# Patient Record
Sex: MALE | Race: WHITE | Employment: FULL TIME | URBAN - METROPOLITAN AREA
[De-identification: names, ages, dates, MRNs, and addresses within clinical notes are randomized per-mention and may not be internally consistent; named-entity substitution may affect disease eponyms.]

---

## 2017-05-09 DIAGNOSIS — R53.83 OTHER FATIGUE: ICD-10-CM

## 2017-05-09 DIAGNOSIS — Z12.5 ENCOUNTER FOR SCREENING FOR MALIGNANT NEOPLASM OF PROSTATE: ICD-10-CM

## 2017-05-09 DIAGNOSIS — Z00.00 ENCOUNTER FOR GENERAL ADULT MEDICAL EXAMINATION WITHOUT ABNORMAL FINDINGS: ICD-10-CM

## 2017-05-09 DIAGNOSIS — F32.9 MAJOR DEPRESSIVE DISORDER, SINGLE EPISODE: ICD-10-CM

## 2017-07-21 ENCOUNTER — LAB CONVERSION - ENCOUNTER (OUTPATIENT)
Dept: OTHER | Facility: OTHER | Age: 52
End: 2017-07-21

## 2017-07-21 ENCOUNTER — GENERIC CONVERSION - ENCOUNTER (OUTPATIENT)
Dept: OTHER | Facility: OTHER | Age: 52
End: 2017-07-21

## 2017-07-21 LAB
A/G RATIO (HISTORICAL): 1.6 (CALC) (ref 1–2.5)
ALBUMIN SERPL BCP-MCNC: 4.2 G/DL (ref 3.6–5.1)
ALP SERPL-CCNC: 53 U/L (ref 33–130)
ALT SERPL W P-5'-P-CCNC: 16 U/L (ref 6–29)
AST SERPL W P-5'-P-CCNC: 17 U/L (ref 10–35)
BASOPHILS # BLD AUTO: 1.1 %
BASOPHILS # BLD AUTO: 41 CELLS/UL (ref 0–200)
BILIRUB SERPL-MCNC: 0.6 MG/DL (ref 0.2–1.2)
BUN SERPL-MCNC: 15 MG/DL (ref 7–25)
BUN/CREA RATIO (HISTORICAL): 12 (CALC) (ref 6–22)
CALCIUM SERPL-MCNC: 9.2 MG/DL (ref 8.6–10.4)
CHLORIDE SERPL-SCNC: 107 MMOL/L (ref 98–110)
CHOLEST SERPL-MCNC: 160 MG/DL (ref 125–200)
CHOLEST/HDLC SERPL: 3.3 (CALC)
CO2 SERPL-SCNC: 29 MMOL/L (ref 20–31)
CREAT SERPL-MCNC: 1.21 MG/DL (ref 0.5–1.05)
DEPRECATED RDW RBC AUTO: 12.1 % (ref 11–15)
EGFR AFRICAN AMERICAN (HISTORICAL): 60 ML/MIN/1.73M2
EGFR-AMERICAN CALC (HISTORICAL): 52 ML/MIN/1.73M2
EOSINOPHIL # BLD AUTO: 130 CELLS/UL (ref 15–500)
EOSINOPHIL # BLD AUTO: 3.5 %
GAMMA GLOBULIN (HISTORICAL): 2.6 G/DL (CALC) (ref 1.9–3.7)
GLUCOSE (HISTORICAL): 93 MG/DL (ref 65–99)
HCT VFR BLD AUTO: 45.5 % (ref 35–45)
HDLC SERPL-MCNC: 48 MG/DL
HGB BLD-MCNC: 15.2 G/DL (ref 11.7–15.5)
LDL CHOLESTEROL (HISTORICAL): 92 MG/DL (CALC)
LYMPHOCYTES # BLD AUTO: 1695 CELLS/UL (ref 850–3900)
LYMPHOCYTES # BLD AUTO: 45.8 %
MCH RBC QN AUTO: 29.9 PG (ref 27–33)
MCHC RBC AUTO-ENTMCNC: 33.4 G/DL (ref 32–36)
MCV RBC AUTO: 89.4 FL (ref 80–100)
MONOCYTES # BLD AUTO: 429 CELLS/UL (ref 200–950)
MONOCYTES (HISTORICAL): 11.6 %
NEUTROPHILS # BLD AUTO: 1406 CELLS/UL (ref 1500–7800)
NEUTROPHILS # BLD AUTO: 38 %
NON-HDL-CHOL (CHOL-HDL) (HISTORICAL): 112 MG/DL (CALC)
PLATELET # BLD AUTO: 204 THOUSAND/UL (ref 140–400)
PMV BLD AUTO: 9.6 FL (ref 7.5–12.5)
POTASSIUM SERPL-SCNC: 4.1 MMOL/L (ref 3.5–5.3)
PROSTATE SPECIFIC ANTIGEN TOTAL (HISTORICAL): 1.8 NG/ML
RBC # BLD AUTO: 5.09 MILLION/UL (ref 3.8–5.1)
SODIUM SERPL-SCNC: 143 MMOL/L (ref 135–146)
TOTAL PROTEIN (HISTORICAL): 6.8 G/DL (ref 6.1–8.1)
TRIGL SERPL-MCNC: 98 MG/DL
TSH SERPL DL<=0.05 MIU/L-ACNC: 1.46 MIU/L
WBC # BLD AUTO: 3.7 THOUSAND/UL (ref 3.8–10.8)

## 2017-08-03 ENCOUNTER — GENERIC CONVERSION - ENCOUNTER (OUTPATIENT)
Dept: OTHER | Facility: OTHER | Age: 52
End: 2017-08-03

## 2017-08-03 ENCOUNTER — ALLSCRIPTS OFFICE VISIT (OUTPATIENT)
Dept: OTHER | Facility: OTHER | Age: 52
End: 2017-08-03

## 2018-01-08 ENCOUNTER — GENERIC CONVERSION - ENCOUNTER (OUTPATIENT)
Dept: FAMILY MEDICINE CLINIC | Facility: CLINIC | Age: 53
End: 2018-01-08

## 2018-01-13 VITALS
HEIGHT: 72 IN | BODY MASS INDEX: 24.97 KG/M2 | SYSTOLIC BLOOD PRESSURE: 130 MMHG | DIASTOLIC BLOOD PRESSURE: 70 MMHG | HEART RATE: 60 BPM | WEIGHT: 184.38 LBS

## 2018-01-15 NOTE — RESULT NOTES
Verified Results  (Q) LIPID PANEL WITH REFLEX TO DIRECT LDL [id number] 07:15AM [facility]     Test Name Result Flag Reference   CHOLESTEROL, TOTAL 160 mg/dL  125-200   HDL CHOLESTEROL 48 mg/dL  > OR = 46   TRIGLICERIDES 98 mg/dL  <113   LDL-CHOLESTEROL 92 mg/dL (calc)  <130   Desirable range <100 mg/dL for patients with CHD or  diabetes and <70 mg/dL for diabetic patients with  known heart disease  CHOL/HDLC RATIO 3 3 (calc)  < OR = 5 0   NON HDL CHOLESTEROL 112 mg/dL (calc)     Target for non-HDL cholesterol is 30 mg/dL higher than   LDL cholesterol target  (1) COMPREHENSIVE METABOLIC PANEL [id number] 09:18RH [facility]     Test Name Result Flag Reference   GLUCOSE 93 mg/dL  65-99   Fasting reference interval   UREA NITROGEN (BUN) 15 mg/dL  7-25   CREATININE 1 21 mg/dL H 0 50-1 05   For patients >52years of age, the reference limit  for Creatinine is approximately 13% higher for people  identified as -American  eGFR NON-AFR   AMERICAN 52 mL/min/1 73m2 L > OR = 60   eGFR AFRICAN AMERICAN 60 mL/min/1 73m2  > OR = 60   BUN/CREATININE RATIO 12 (calc)  6-22   SODIUM 143 mmol/L  135-146   POTASSIUM 4 1 mmol/L  3 5-5 3   CHLORIDE 107 mmol/L     CARBON DIOXIDE 29 mmol/L  20-31   CALCIUM 9 2 mg/dL  8 6-10 4   PROTEIN, TOTAL 6 8 g/dL  6 1-8 1   ALBUMIN 4 2 g/dL  3 6-5 1   GLOBULIN 2 6 g/dL (calc)  1 9-3 7   ALBUMIN/GLOBULIN RATIO 1 6 (calc)  1 0-2 5   BILIRUBIN, TOTAL 0 6 mg/dL  0 2-1 2   ALKALINE PHOSPHATASE 53 U/L     AST 17 U/L  10-35   ALT 16 U/L  6-29     (1) CBC/PLT/DIFF [id number] 07:15AM [facility]     Test Name Result Flag Reference   WHITE BLOOD CELL COUNT 3 7 Thousand/uL L 3 8-10 8   RED BLOOD CELL COUNT 5 09 Million/uL  3 80-5 10   HEMOGLOBIN 15 2 g/dL  11 7-15 5   HEMATOCRIT 45 5 % H 35 0-45 0   MCV 89 4 fL  80 0-100 0   MCH 29 9 pg  27 0-33 0   MCHC 33 4 g/dL  32 0-36 0   RDW 12 1 %  11 0-15 0   PLATELET COUNT 322 Thousand/uL  140-400   ABSOLUTE NEUTROPHILS 1406 cells/uL L 9932-7330   ABSOLUTE LYMPHOCYTES 1695 cells/uL  850-3900   ABSOLUTE MONOCYTES 429 cells/uL  200-950   ABSOLUTE EOSINOPHILS 130 cells/uL     ABSOLUTE BASOPHILS 41 cells/uL  0-200   NEUTROPHILS 38 %     LYMPHOCYTES 45 8 %     MONOCYTES 11 6 %     EOSINOPHILS 3 5 %     BASOPHILS 1 1 %     MPV 9 6 fL  7 5-12 5     (1) PSA (SCREEN) (Dx V76 44 Screen for Prostate Cancer) [id number] 07:15AM [facility]     Test Name Result Flag Reference   PSA, TOTAL 1 8 ng/mL     The total PSA value from this assay system is   standardized against the WHO standard  The test   result will be approximately 20% lower when compared   to the equimolar-standardized total PSA (Naldo   [place])  Comparison of serial PSA results should be   interpreted with this fact in mind  This test was performed using the Siemens   chemiluminescent method  Values obtained from   different assay methods cannot be used  interchangeably  PSA levels, regardless of  value, should not be interpreted as absolute  evidence of the presence or absence of disease                 Reference Range               Female:  Not established                Male:    < or = 4 0     (Q) TSH, 3RD GENERATION W/REFLEX TO FT4 [id number] 07:15AM [facility]     Test Name Result Flag Reference   TSH W/REFLEX TO FT4 1 46 mIU/L     Reference Range                         > or = 20 Years  0 40-4 50                              Pregnancy Ranges            First trimester    0 26-2 66            Second trimester   0 55-2 73            Third trimester    0 43-2 91

## 2018-01-15 NOTE — MISCELLANEOUS
Message  received call from lab with "critical results" absolute neutrophils - per [facility], results ok to wait until pt's appt next wk      Active Problems    1  Abdominal pain (789 00) (R10 9)   2  Acute pharyngitis (462) (J02 9)   3  Depression (311) (F32 9)   4  Encounter for prostate cancer screening (V76 44) (Z12 5)   5  Epidermal inclusion cyst (706 2) (L72 0)   6  Erectile dysfunction of non-organic origin (302 72) (F52 21)   7  Exposure to gonorrhea (V01 6) (Z20 2)   8  Fatigue (780 79) (R53 83)   9  High risk sexual behavior (V69 2) (Z72 51)   10  Leukopenia (288 50) (D72 819)   11  Medial epicondylitis (726 31) (M77 00)   12  Palpitations (785 1) (R00 2)   13  Screening for heart disease (V81 2) (Z13 6)   14  Skin lesion of face (709 9) (L98 9)   15  Verruca vulgaris (078 10) (B07 9)    Current Meds   1  Cialis 5 MG Oral Tablet; take 1 tablet daily 1 hour before needed; Therapy: 69Exe9367 to (Evaluate:10Nov2015)  Requested for: 49Mpe0867; Last   Rx:[id number] Ordered   2  Citalopram Hydrobromide 10 MG Oral Tablet; Take 1 tablet by mouth daily; Therapy: 11MCV8608 to (Evaluate:05Jun2016)  Requested for: 40JJP6951; Last   Rx:[id number] Ordered   3  Diclofenac Potassium 50 MG Oral Tablet; take 1 tablet 3 times daily after meals; Therapy: 28SXF2539 to (Evaluate:30Mar2016)  Requested for: 79Wyx1481; Last   Rx:[id number] Ordered    Allergies    1   No Known Drug Allergies    Signatures   Electronically signed by : Rahel Abreu, ; May 20 2016 12:41PM EST                       (Author)

## 2018-12-24 ENCOUNTER — OFFICE VISIT (OUTPATIENT)
Dept: URGENT CARE | Facility: MEDICAL CENTER | Age: 53
End: 2018-12-24
Payer: COMMERCIAL

## 2018-12-24 ENCOUNTER — APPOINTMENT (OUTPATIENT)
Dept: RADIOLOGY | Facility: MEDICAL CENTER | Age: 53
End: 2018-12-24
Payer: COMMERCIAL

## 2018-12-24 VITALS
OXYGEN SATURATION: 99 % | WEIGHT: 179.4 LBS | HEART RATE: 61 BPM | BODY MASS INDEX: 24.3 KG/M2 | TEMPERATURE: 97 F | HEIGHT: 72 IN | SYSTOLIC BLOOD PRESSURE: 116 MMHG | DIASTOLIC BLOOD PRESSURE: 80 MMHG | RESPIRATION RATE: 18 BRPM

## 2018-12-24 DIAGNOSIS — J02.9 ACUTE PHARYNGITIS, UNSPECIFIED ETIOLOGY: ICD-10-CM

## 2018-12-24 DIAGNOSIS — R07.9 CHEST PAIN, UNSPECIFIED TYPE: Primary | ICD-10-CM

## 2018-12-24 DIAGNOSIS — R07.9 CHEST PAIN, UNSPECIFIED TYPE: ICD-10-CM

## 2018-12-24 LAB — S PYO AG THROAT QL: NEGATIVE

## 2018-12-24 PROCEDURE — 87430 STREP A AG IA: CPT | Performed by: NURSE PRACTITIONER

## 2018-12-24 PROCEDURE — 87070 CULTURE OTHR SPECIMN AEROBIC: CPT | Performed by: NURSE PRACTITIONER

## 2018-12-24 PROCEDURE — 71046 X-RAY EXAM CHEST 2 VIEWS: CPT

## 2018-12-24 PROCEDURE — 93005 ELECTROCARDIOGRAM TRACING: CPT | Performed by: NURSE PRACTITIONER

## 2018-12-24 PROCEDURE — 99204 OFFICE O/P NEW MOD 45 MIN: CPT | Performed by: NURSE PRACTITIONER

## 2018-12-24 NOTE — PROGRESS NOTES
[facility]        NAME: Fidelia Mcknight is a 48 y o  male  : 1965    MRN: [id number]  DATE: 2018  TIME: 11:36 AM    Assessment and Plan   Chest pain, unspecified type [R07 9]  1  Chest pain, unspecified type  XR chest pa & lateral    POCT ECG   2  Acute pharyngitis, unspecified etiology  POCT rapid strepA    Throat culture         Patient Instructions   In office rapid strep negative, await throat culture  In office EKG interpreted as sinus himanshu  In office CXR interpreted as negative,confirmed by radiology  Suspected to be related to muscle strain  No acute findings at this time  VSS  May alternate Tylenol and Ibuprofen as needed  Encourage fluids and rest    Saline nasal spray as needed  Humidify bedroom  Salt water gargles  Chloraseptic spray and lozenges as needed  Await throat culture  Follow up with PCP in 3-5 days  Proceed to  ER if symptoms worsen  Chief Complaint     Chief Complaint   Patient presents with    Pain With Breathing     Patient here with complaint of chest discomfort with deep breaths  Slight cough  History of Present Illness       Patient presents in office with pain in L side of ACW with deep inhalation after [employer]  Feels like pain wraps around to back  Last evening felt "a gurgling sensation" in chest  Also with dry cough, noting to be clearing cough  Denies fevers, chills, N/V  One episode of diarrhea, no blood in stool  No previous cardiac history  Worse with deep breaths  Symptoms have improved since last evening  Denies numbness/tingling/heaviness of L arm  Was splitting firewood yesterday after pain  No specific injury/trauma/fall  Review of Systems   Review of Systems   Constitutional: Negative for chills and fever  HENT: Positive for postnasal drip and rhinorrhea  Negative for congestion, ear pain, sinus pressure and sore throat      Respiratory: Negative for cough, chest tightness, shortness of breath and wheezing  Cardiovascular: Positive for chest pain  Negative for palpitations and leg swelling  Gastrointestinal: Negative  Musculoskeletal: Negative for myalgias  Skin: Negative for rash  Current Medications     No current outpatient prescriptions on file  Current Allergies     Allergies as of 12/24/2018 - Reviewed 12/24/2018   Allergen Reaction Noted    Glyceryl monolaurate Other (See Comments) 09/06/2008    Latex Other (See Comments) 09/06/2008            The following portions of the patient's history were reviewed and updated as appropriate: allergies, current medications, past family history, past medical history, past social history, past surgical history and problem list      No past medical history on file  No past surgical history on file  No family history on file  Medications have been verified  Objective   /80   Pulse 61   Temp (!) 97 °F (36 1 °C)   Resp 18   Ht 6' (1 829 m)   Wt 81 4 kg (179 lb 6 4 oz)   SpO2 99%   BMI 24 33 kg/m²        Physical Exam     Physical Exam   Constitutional: He is oriented to person, place, and time  Vital signs are normal  He appears well-developed and well-nourished  He is cooperative  Non-toxic appearance  He does not have a sickly appearance  He does not appear ill  No distress  HENT:   Head: Normocephalic and atraumatic  Right Ear: Hearing, tympanic membrane, external ear and ear canal normal    Left Ear: Hearing, tympanic membrane, external ear and ear canal normal    Nose: Nose normal  No mucosal edema, rhinorrhea or sinus tenderness  Right sinus exhibits no maxillary sinus tenderness and no frontal sinus tenderness  Left sinus exhibits no maxillary sinus tenderness and no frontal sinus tenderness  Mouth/Throat: Uvula is midline and mucous membranes are normal  Normal dentition  Posterior oropharyngeal erythema present  No oropharyngeal exudate  Eyes: Pupils are equal, round, and reactive to light  Conjunctivae, EOM and lids are normal  Right eye exhibits no discharge  Left eye exhibits no discharge  Neck: Trachea normal and normal range of motion  No thyroid mass and no thyromegaly present  Cardiovascular: Normal rate, regular rhythm, S1 normal, S2 normal, normal heart sounds, intact distal pulses and normal pulses  Exam reveals no gallop and no friction rub  No murmur heard  Pulmonary/Chest: Effort normal and breath sounds normal  No respiratory distress  He has no decreased breath sounds  He has no wheezes  He has no rhonchi  He has no rales  He exhibits no tenderness  No chest wall tenderness noted  Full ROM noted of L shoulder  Musculoskeletal: Normal range of motion  He exhibits no edema  Cervical back: Normal         Thoracic back: Normal    Lymphadenopathy:     He has no cervical adenopathy  Neurological: He is alert and oriented to person, place, and time  Skin: Skin is warm and dry  No rash noted  He is not diaphoretic  Psychiatric: He has a normal mood and affect  His behavior is normal  Thought content normal    Nursing note and vitals reviewed

## 2018-12-24 NOTE — PATIENT INSTRUCTIONS
May alternate Tylenol and Ibuprofen as needed  Encourage fluids and rest    Saline nasal spray as needed  Humidify bedroom  Salt water gargles  Chloraseptic spray and lozenges as needed  Await throat culture  F/U with PCP if symptoms persist/worsen or go to nearest emergency department if any signs of distress  Muscle Strain   AMBULATORY CARE:   A muscle strain  is a twist, pull, or tear of a muscle or tendon  A tendon is a strong elastic tissue that connects a muscle to a bone  Common symptoms include the following:   · Bruised skin on the area of your injured muscle    · Muscle soreness, cramps, or spasms    · Little or stiff muscle movement, or loss of muscle strength    · Swelling in the area of the injury    · Muscle pain that gets worse with activity, or pain that moves or spreads to another body area    · Crepitus (crackling sound or grating feeling) when you move your muscle  Seek immediate care for the following symptoms:   · Sudden loss of feeling or movement in your injured muscle    Contact your healthcare provider if:   · Your pain and swelling worsen or do not go away  · You have questions or concerns about your condition or care  Treatment for a muscle strain  may include any of the following:  · NSAIDs , such as ibuprofen, help decrease swelling, pain, and fever  This medicine is available with or without a doctor's order  NSAIDs can cause stomach bleeding or kidney problems in certain people  If you take blood thinner medicine, always ask your healthcare provider if NSAIDs are safe for you  Always read the medicine label and follow directions  · Muscle relaxers  help decrease pain and muscle spasms, and relax your muscles  · Steroid medicine  may be given to decrease pain and inflammation  · Local anesthetic  is medicine used to numb the area for a short time  This is often used if you have a muscle strain in your back      · Physical therapy exercises  may help improve movement and decrease pain  Physical therapy can also help improve strength and decrease your risk for loss of function  · Surgery  may be needed if your muscle strain does not heal after 6 months of treatment  Surgery may be done to drain blood that has pooled in your muscle  If your tendon was torn off of the bone, it may be put back with surgery  Manage your symptoms:   · Rest your muscle  to allow your injury to heal  When the pain decreases, begin normal, slow movements  For mild and moderate muscle strains, rest your muscles for about 2 days  Rest for 10 to 14 days if you have a severe muscle strain  You may need to use crutches if your muscle strain is in your legs  · Apply ice on your injured area  for 15 to 20 minutes every hour or as directed  Use an ice pack, or put crushed ice in a plastic bag  Cover it with a towel  Ice helps prevent tissue damage and decreases swelling and pain  · Use an elastic bandage as directed  Wrap the bandage around the area to decrease swelling  It should be snug, but not too tight  · Elevate your injured muscle  above the level of your heart as often as you can  This will help decrease swelling and pain  Prop your injured muscle on pillows or blankets to keep it elevated comfortably  · Stretch and strengthen your muscles each day  Stretch for about 30 seconds, 4 times a day  Stretch the muscle until you feel a slight pull  Stop stretching if you feel pain  Start to exercise and strengthen your muscles slowly  Increase the time and amount you exercise  Prevent another muscle strain:   · Always wear proper shoes when you play sports  Replace your old running shoes with new ones often if you are a runner  Use shoe inserts or arch supports to correct leg or foot problems  Ask your healthcare provider for more information on shoe supports  · Do warm up and cool down exercises  Do stretching exercises before you work out or do sports activities  These exercises will help loosen and decrease stress on your muscles  Cool down and stretch after your workout  Do not stop and rest after a workout without cooling down first            · Keep your muscles strong with strength training exercises  Exercises such as weight lifting and stretching exercises help keep your muscles flexible and strong  A physical therapist or  may help you with these exercises  · Slowly start your exercise or sports training program   Follow your healthcare provider's directions on when to start exercising  Slowly increase time, distance, and how often you train  Sudden increases in how often you train may cause you to injure your muscle again  Follow up with your healthcare provider as directed:  Write down your questions so you remember to ask them during your visits  © 2017 [employer] Information is for End User's use only and may not be sold, redistributed or otherwise used for commercial purposes  All illustrations and images included in CareNotes® are the copyrighted property of [employer]  or [employer]  The above information is an  only  It is not intended as medical advice for individual conditions or treatments  Talk to your doctor, nurse or pharmacist before following any medical regimen to see if it is safe and effective for you

## 2018-12-25 LAB
ATRIAL RATE: 59 BPM
P AXIS: 60 DEGREES
PR INTERVAL: 162 MS
QRS AXIS: 75 DEGREES
QRSD INTERVAL: 106 MS
QT INTERVAL: 420 MS
QTC INTERVAL: 415 MS
T WAVE AXIS: 49 DEGREES
VENTRICULAR RATE: 59 BPM

## 2018-12-25 PROCEDURE — 93010 ELECTROCARDIOGRAM REPORT: CPT | Performed by: INTERNAL MEDICINE

## 2018-12-26 LAB — BACTERIA THROAT CULT: NORMAL

## 2018-12-30 PROCEDURE — 93010 ELECTROCARDIOGRAM REPORT: CPT | Performed by: INTERNAL MEDICINE

## 2019-01-15 ENCOUNTER — OFFICE VISIT (OUTPATIENT)
Dept: FAMILY MEDICINE CLINIC | Facility: CLINIC | Age: 54
End: 2019-01-15
Payer: COMMERCIAL

## 2019-01-15 VITALS
HEART RATE: 80 BPM | DIASTOLIC BLOOD PRESSURE: 84 MMHG | BODY MASS INDEX: 24.24 KG/M2 | WEIGHT: 179 LBS | HEIGHT: 72 IN | SYSTOLIC BLOOD PRESSURE: 122 MMHG

## 2019-01-15 DIAGNOSIS — N40.0 BENIGN PROSTATIC HYPERPLASIA, UNSPECIFIED WHETHER LOWER URINARY TRACT SYMPTOMS PRESENT: ICD-10-CM

## 2019-01-15 DIAGNOSIS — Z13.6 SCREENING FOR HEART DISEASE: ICD-10-CM
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
## 2019-01-15 DIAGNOSIS — Z00.00 HEALTH CARE MAINTENANCE: Primary | ICD-10-CM

## 2019-01-15 DIAGNOSIS — F52.21 ERECTILE DISORDER, ACQUIRED, SITUATIONAL, MILD: ICD-10-CM

## 2019-01-15 PROBLEM — K90.0 CELIAC DISEASE: Status: ACTIVE | Noted: 2019-01-15

## 2019-01-15 PROCEDURE — 99396 PREV VISIT EST AGE 40-64: CPT | Performed by: PHYSICIAN ASSISTANT

## 2019-01-15 RX ORDER — SILDENAFIL 25 MG/1
25 TABLET, FILM COATED ORAL DAILY PRN
Qty: 10 TABLET | Refills: 0 | Status: SHIPPED | OUTPATIENT
Start: 2019-01-15 | End: 2021-01-04 | Stop reason: ALTCHOICE

## 2019-01-15 NOTE — PATIENT INSTRUCTIONS
1  Health care maintenance-recommend assessing baseline blood test to include CBC, CMP, lipids, TSH, PSA, and Lyme titer  2  Screening for heart disease-will check lipids in follow-up as necessary  3  History of possible tick bite-will assess Lyme titer  Patient is asymptomatic  4  BPH-presently stable  Patient continues Rapaflo as necessary for symptoms  5  Erectile dysfunction-patient had light sensitivity with higher doses of sildenafil however secondary to the cost we will try lower dose sildenafil rather than switching to Cialis immediately  If he still has symptoms with 25 mg of sildenafil we will then call in a prescription for Cialis instead

## 2019-01-15 NOTE — PROGRESS NOTES
Assessment/Plan:  Patient Instructions   1  Health care maintenance-recommend assessing baseline blood test to include CBC, CMP, lipids, TSH, PSA, and Lyme titer  2  Screening for heart disease-will check lipids in follow-up as necessary  3  History of possible tick bite-will assess Lyme titer  Patient is asymptomatic  4  BPH-presently stable  Patient continues Rapaflo as necessary for symptoms  5  Erectile dysfunction-patient had light sensitivity with higher doses of sildenafil however secondary to the cost we will try lower dose sildenafil rather than switching to Cialis immediately  If he still has symptoms with 25 mg of sildenafil we will then call in a prescription for Cialis instead  Diagnoses and all orders for this visit:    Health care maintenance  -     CBC and differential  -     Comprehensive metabolic panel  -     Lipid Panel with Direct LDL reflex  -     TSH, 3rd generation with Free T4 reflex  -     PSA, Total Screen    Screening for heart disease  -     CBC and differential  -     Comprehensive metabolic panel  -     Lipid Panel with Direct LDL reflex  -     TSH, 3rd generation with Free T4 reflex  -     PSA, Total Screen    Benign prostatic hyperplasia, unspecified whether lower urinary tract symptoms present  -     CBC and differential  -     Comprehensive metabolic panel  -     Lipid Panel with Direct LDL reflex  -     TSH, 3rd generation with Free T4 reflex  -     PSA, Total Screen    Erectile disorder, acquired, situational, mild  -     sildenafil (VIAGRA) 25 MG tablet; Take 1 tablet (25 mg total) by mouth daily as needed for erectile dysfunction    Tick bite, sequela  -     Lyme Antibody Profile with reflex to WB          Subjective:   Yearly Physical  s     Patient ID: Xavi Evans is a 48 y o  male  HPI:  This is a 66-year-old gentleman that presents to the office for health maintenance physical   He has been feeling well without any acute complaints    He has been able to wean off of citalopram several months ago and has been doing well since off of it  He has recently gotten remarried and has been very happy  He does mention that sometimes he has some erectile difficulties and is requesting renewal of Cialis  He has tried Viagra in the past but had some light sensitivity issues with it  He does have a history of celiac disease and occasionally will have some digestive problems after eating something he knows would exacerbate his symptoms  He also continues to follow with colorectal specialist regularly and has had normal colonoscopies  The following portions of the patient's history were reviewed and updated as appropriate: allergies, current medications, past family history, past medical history, past social history, past surgical history and problem list     Review of Systems   Constitutional: Negative for chills, fatigue and fever  HENT: Negative for congestion, ear pain and sinus pressure  Eyes: Negative for visual disturbance  Respiratory: Negative for cough, chest tightness and shortness of breath  Cardiovascular: Negative for chest pain and palpitations  Gastrointestinal: Negative for diarrhea, nausea and vomiting  Endocrine: Negative for polyuria  Genitourinary: Negative for dysuria and frequency  Musculoskeletal: Negative for arthralgias and myalgias  Skin: Negative for pallor and rash  Neurological: Negative for dizziness, weakness, light-headedness, numbness and headaches  Psychiatric/Behavioral: Negative for agitation, behavioral problems and sleep disturbance  All other systems reviewed and are negative  Objective:      /84   Pulse 80   Ht 6' (1 829 m)   Wt 81 2 kg (179 lb)   BMI 24 28 kg/m²          Physical Exam   Constitutional: He is oriented to person, place, and time  He appears well-developed and well-nourished  No distress  HENT:   Head: Normocephalic and atraumatic     Right Ear: External ear normal  Left Ear: External ear normal    Nose: Nose normal    Mouth/Throat: Oropharynx is clear and moist  No oropharyngeal exudate  Eyes: Pupils are equal, round, and reactive to light  Conjunctivae and EOM are normal    Neck: Normal range of motion  Neck supple  No tracheal deviation present  No thyromegaly present  Cardiovascular: Normal rate, regular rhythm and normal heart sounds  Exam reveals no friction rub  No murmur heard  Pulmonary/Chest: Effort normal and breath sounds normal  No respiratory distress  He has no wheezes  He has no rales  Abdominal: Soft  Bowel sounds are normal  He exhibits no distension  There is no tenderness  There is no rebound and no guarding  Musculoskeletal: Normal range of motion  He exhibits no edema or tenderness  Lymphadenopathy:     He has no cervical adenopathy  Neurological: He is alert and oriented to person, place, and time  No cranial nerve deficit  Coordination normal    Skin: Skin is warm and dry  No rash noted  No erythema  Psychiatric: He has a normal mood and affect  His behavior is normal  Thought content normal    Nursing note and vitals reviewed

## 2019-01-23 ENCOUNTER — APPOINTMENT (OUTPATIENT)
Dept: LAB | Facility: CLINIC | Age: 54
End: 2019-01-23
Payer: COMMERCIAL

## 2019-01-23 LAB
ALBUMIN SERPL BCP-MCNC: 4.1 G/DL (ref 3.5–5)
ALP SERPL-CCNC: 49 U/L (ref 46–116)
ALT SERPL W P-5'-P-CCNC: 23 U/L (ref 12–78)
ANION GAP SERPL CALCULATED.3IONS-SCNC: 5 MMOL/L (ref 4–13)
AST SERPL W P-5'-P-CCNC: 16 U/L (ref 5–45)
BASOPHILS # BLD AUTO: 0.03 THOUSANDS/ΜL (ref 0–0.1)
BASOPHILS NFR BLD AUTO: 1 % (ref 0–1)
BILIRUB SERPL-MCNC: 0.86 MG/DL (ref 0.2–1)
BUN SERPL-MCNC: 18 MG/DL (ref 5–25)
CALCIUM SERPL-MCNC: 8.5 MG/DL (ref 8.3–10.1)
CHLORIDE SERPL-SCNC: 106 MMOL/L (ref 100–108)
CHOLEST SERPL-MCNC: 160 MG/DL (ref 50–200)
CO2 SERPL-SCNC: 28 MMOL/L (ref 21–32)
CREAT SERPL-MCNC: 1.2 MG/DL (ref 0.6–1.3)
EOSINOPHIL # BLD AUTO: 0.18 THOUSAND/ΜL (ref 0–0.61)
EOSINOPHIL NFR BLD AUTO: 5 % (ref 0–6)
ERYTHROCYTE [DISTWIDTH] IN BLOOD BY AUTOMATED COUNT: 12.3 % (ref 11.6–15.1)
GFR SERPL CREATININE-BSD FRML MDRD: 69 ML/MIN/1.73SQ M
GLUCOSE P FAST SERPL-MCNC: 97 MG/DL (ref 65–99)
HCT VFR BLD AUTO: 44.7 % (ref 36.5–49.3)
HDLC SERPL-MCNC: 63 MG/DL (ref 40–60)
HGB BLD-MCNC: 14.9 G/DL (ref 12–17)
IMM GRANULOCYTES # BLD AUTO: 0.01 THOUSAND/UL (ref 0–0.2)
IMM GRANULOCYTES NFR BLD AUTO: 0 % (ref 0–2)
LDLC SERPL CALC-MCNC: 82 MG/DL (ref 0–100)
LYMPHOCYTES # BLD AUTO: 1.47 THOUSANDS/ΜL (ref 0.6–4.47)
LYMPHOCYTES NFR BLD AUTO: 43 % (ref 14–44)
MCH RBC QN AUTO: 30.3 PG (ref 26.8–34.3)
MCHC RBC AUTO-ENTMCNC: 33.3 G/DL (ref 31.4–37.4)
MCV RBC AUTO: 91 FL (ref 82–98)
MONOCYTES # BLD AUTO: 0.35 THOUSAND/ΜL (ref 0.17–1.22)
MONOCYTES NFR BLD AUTO: 10 % (ref 4–12)
NEUTROPHILS # BLD AUTO: 1.41 THOUSANDS/ΜL (ref 1.85–7.62)
NEUTS SEG NFR BLD AUTO: 41 % (ref 43–75)
NRBC BLD AUTO-RTO: 0 /100 WBCS
PLATELET # BLD AUTO: 189 THOUSANDS/UL (ref 149–390)
PMV BLD AUTO: 10.6 FL (ref 8.9–12.7)
POTASSIUM SERPL-SCNC: 4.1 MMOL/L (ref 3.5–5.3)
PROT SERPL-MCNC: 7.3 G/DL (ref 6.4–8.2)
PSA SERPL-MCNC: 1.6 NG/ML (ref 0–4)
RBC # BLD AUTO: 4.92 MILLION/UL (ref 3.88–5.62)
SODIUM SERPL-SCNC: 139 MMOL/L (ref 136–145)
TRIGL SERPL-MCNC: 75 MG/DL
TSH SERPL DL<=0.05 MIU/L-ACNC: 2.17 UIU/ML (ref 0.36–3.74)
WBC # BLD AUTO: 3.45 THOUSAND/UL (ref 4.31–10.16)

## 2019-01-23 PROCEDURE — G0103 PSA SCREENING: HCPCS | Performed by: PHYSICIAN ASSISTANT

## 2019-01-23 PROCEDURE — 85025 COMPLETE CBC W/AUTO DIFF WBC: CPT | Performed by: PHYSICIAN ASSISTANT

## 2019-01-23 PROCEDURE — 80053 COMPREHEN METABOLIC PANEL: CPT | Performed by: PHYSICIAN ASSISTANT

## 2019-01-23 PROCEDURE — 80061 LIPID PANEL: CPT | Performed by: PHYSICIAN ASSISTANT

## 2019-01-23 PROCEDURE — 36415 COLL VENOUS BLD VENIPUNCTURE: CPT | Performed by: PHYSICIAN ASSISTANT

## 2019-01-23 PROCEDURE — 86618 LYME DISEASE ANTIBODY: CPT | Performed by: PHYSICIAN ASSISTANT

## 2019-01-23 PROCEDURE — 84443 ASSAY THYROID STIM HORMONE: CPT | Performed by: PHYSICIAN ASSISTANT

## 2019-01-24 LAB
B BURGDOR IGG SER IA-ACNC: 0.12
B BURGDOR IGM SER IA-ACNC: 0.47

## 2019-01-31 ENCOUNTER — OFFICE VISIT (OUTPATIENT)
Dept: FAMILY MEDICINE CLINIC | Facility: CLINIC | Age: 54
End: 2019-01-31
Payer: COMMERCIAL

## 2019-01-31 VITALS
HEART RATE: 76 BPM | BODY MASS INDEX: 24.24 KG/M2 | WEIGHT: 179 LBS | HEIGHT: 72 IN | DIASTOLIC BLOOD PRESSURE: 78 MMHG | SYSTOLIC BLOOD PRESSURE: 120 MMHG

## 2019-01-31 DIAGNOSIS — D70.9 NEUTROPENIA, UNSPECIFIED TYPE (HCC): Primary | ICD-10-CM

## 2019-01-31 PROCEDURE — 99213 OFFICE O/P EST LOW 20 MIN: CPT | Performed by: PHYSICIAN ASSISTANT

## 2019-01-31 PROCEDURE — 3008F BODY MASS INDEX DOCD: CPT | Performed by: PHYSICIAN ASSISTANT

## 2019-01-31 PROCEDURE — 1036F TOBACCO NON-USER: CPT | Performed by: PHYSICIAN ASSISTANT

## 2019-01-31 NOTE — PATIENT INSTRUCTIONS
1  Leukopenia-patient has had a chronic leukopenia over the past 5 years  He did previously have evaluation by Hematology and bone marrow biopsy which was negative  Total white count is at 3 45 with neutrophils on the low side  We will reassess in the next 6-12 months  Consider further follow-up with Hematology if numbers would continue to fall  Otherwise this has been fairly stable over past 5 years since his biopsy

## 2019-01-31 NOTE — PROGRESS NOTES
----- Message from Tatum Moraes MA sent at 10/16/2017 12:46 PM EDT -----  Pt calling for refill    Hydrocodone 10/325mg    lov 9/26  Next appt 1/2018    Pt# [phone] okay to leave msg    ALSO referral for Pain [facility] Montes   Assessment/Plan:  Patient Instructions   1  Leukopenia-patient has had a chronic leukopenia over the past 5 years  He did previously have evaluation by Hematology and bone marrow biopsy which was negative  Total white count is at 3 45 with neutrophils on the low side  We will reassess in the next 6-12 months  Consider further follow-up with Hematology if numbers would continue to fall  Otherwise this has been fairly stable over past 5 years since his biopsy  Diagnoses and all orders for this visit:    Neutropenia, unspecified type ([address] )          Subjective: Follow up to review bw results  [facility] [place]     Patient ID: Sherryle Forester is a 48 y o  male  HPI:  This is a 80-year-old gentleman that presents to the office for follow-up of chronic health conditions and recent labs  He has been feeling well in continues with regular physical exercise  Did have some questions after reviewing his blood work regarding his white blood cell counts and other numbers  The following portions of the patient's history were reviewed and updated as appropriate: allergies, current medications, past family history, past medical history, past social history, past surgical history and problem list     Review of Systems   Constitutional: Negative for fever  HENT: Negative for congestion  Respiratory: Negative for cough, chest tightness and shortness of breath  Cardiovascular: Negative for chest pain  Musculoskeletal: Negative for arthralgias  Objective:      /78   Pulse 76   Ht 6' (1 829 m)   Wt 81 2 kg (179 lb)   BMI 24 28 kg/m²          Physical Exam   Constitutional: He is oriented to person, place, and time  He appears well-developed and well-nourished  HENT:   Head: Normocephalic  Cardiovascular: Normal rate and regular rhythm  Pulmonary/Chest: Effort normal and breath sounds normal  No respiratory distress  Abdominal: Soft  Musculoskeletal: Normal range of motion  Neurological: He is alert and oriented to person, place, and time  Psychiatric: He has a normal mood and affect  Nursing note and vitals reviewed

## 2020-01-27 ENCOUNTER — TELEPHONE (OUTPATIENT)
Dept: FAMILY MEDICINE CLINIC | Facility: CLINIC | Age: 55
End: 2020-01-27

## 2020-01-27 DIAGNOSIS — F32.5 MAJOR DEPRESSIVE DISORDER WITH SINGLE EPISODE, IN FULL REMISSION (HCC): ICD-10-CM

## 2020-01-27 DIAGNOSIS — N40.0 BENIGN PROSTATIC HYPERPLASIA, UNSPECIFIED WHETHER LOWER URINARY TRACT SYMPTOMS PRESENT: Primary | ICD-10-CM

## 2020-01-27 NOTE — TELEPHONE ENCOUNTER
PATIENT BEING SEEN ON 02/11/2020 FOR HIS ANNUAL PHYSICAL AND WOULD LIKE TO HAVE LABS DONE BEFORE HIS VISIT   CAN WE PLEASE ORDER LABS FOR PATIENT

## 2020-01-29 ENCOUNTER — APPOINTMENT (OUTPATIENT)
Dept: LAB | Facility: CLINIC | Age: 55
End: 2020-01-29
Payer: COMMERCIAL

## 2020-01-29 LAB
ALBUMIN SERPL BCP-MCNC: 4.1 G/DL (ref 3.5–5)
ALP SERPL-CCNC: 51 U/L (ref 46–116)
ALT SERPL W P-5'-P-CCNC: 38 U/L (ref 12–78)
ANION GAP SERPL CALCULATED.3IONS-SCNC: 6 MMOL/L (ref 4–13)
AST SERPL W P-5'-P-CCNC: 23 U/L (ref 5–45)
BASOPHILS # BLD AUTO: 0.05 THOUSANDS/ΜL (ref 0–0.1)
BASOPHILS NFR BLD AUTO: 1 % (ref 0–1)
BILIRUB SERPL-MCNC: 0.6 MG/DL (ref 0.2–1)
BUN SERPL-MCNC: 15 MG/DL (ref 5–25)
CALCIUM SERPL-MCNC: 8.9 MG/DL (ref 8.3–10.1)
CHLORIDE SERPL-SCNC: 109 MMOL/L (ref 100–108)
CHOLEST SERPL-MCNC: 205 MG/DL (ref 50–200)
CO2 SERPL-SCNC: 28 MMOL/L (ref 21–32)
CREAT SERPL-MCNC: 1.15 MG/DL (ref 0.6–1.3)
EOSINOPHIL # BLD AUTO: 0.18 THOUSAND/ΜL (ref 0–0.61)
EOSINOPHIL NFR BLD AUTO: 4 % (ref 0–6)
ERYTHROCYTE [DISTWIDTH] IN BLOOD BY AUTOMATED COUNT: 12.4 % (ref 11.6–15.1)
GFR SERPL CREATININE-BSD FRML MDRD: 72 ML/MIN/1.73SQ M
GLUCOSE P FAST SERPL-MCNC: 105 MG/DL (ref 65–99)
HCT VFR BLD AUTO: 47.4 % (ref 36.5–49.3)
HDLC SERPL-MCNC: 71 MG/DL
HGB BLD-MCNC: 15.7 G/DL (ref 12–17)
IMM GRANULOCYTES # BLD AUTO: 0.01 THOUSAND/UL (ref 0–0.2)
IMM GRANULOCYTES NFR BLD AUTO: 0 % (ref 0–2)
LDLC SERPL CALC-MCNC: 117 MG/DL (ref 0–100)
LYMPHOCYTES # BLD AUTO: 1.67 THOUSANDS/ΜL (ref 0.6–4.47)
LYMPHOCYTES NFR BLD AUTO: 37 % (ref 14–44)
MCH RBC QN AUTO: 30.8 PG (ref 26.8–34.3)
MCHC RBC AUTO-ENTMCNC: 33.1 G/DL (ref 31.4–37.4)
MCV RBC AUTO: 93 FL (ref 82–98)
MONOCYTES # BLD AUTO: 0.45 THOUSAND/ΜL (ref 0.17–1.22)
MONOCYTES NFR BLD AUTO: 10 % (ref 4–12)
NEUTROPHILS # BLD AUTO: 2.18 THOUSANDS/ΜL (ref 1.85–7.62)
NEUTS SEG NFR BLD AUTO: 48 % (ref 43–75)
NRBC BLD AUTO-RTO: 0 /100 WBCS
PLATELET # BLD AUTO: 171 THOUSANDS/UL (ref 149–390)
PMV BLD AUTO: 9.6 FL (ref 8.9–12.7)
POTASSIUM SERPL-SCNC: 4.5 MMOL/L (ref 3.5–5.3)
PROT SERPL-MCNC: 7.5 G/DL (ref 6.4–8.2)
PSA SERPL-MCNC: 1.4 NG/ML (ref 0–4)
RBC # BLD AUTO: 5.09 MILLION/UL (ref 3.88–5.62)
SODIUM SERPL-SCNC: 143 MMOL/L (ref 136–145)
TRIGL SERPL-MCNC: 84 MG/DL
TSH SERPL DL<=0.05 MIU/L-ACNC: 2.26 UIU/ML (ref 0.36–3.74)
WBC # BLD AUTO: 4.54 THOUSAND/UL (ref 4.31–10.16)

## 2020-01-29 PROCEDURE — 85025 COMPLETE CBC W/AUTO DIFF WBC: CPT | Performed by: PHYSICIAN ASSISTANT

## 2020-01-29 PROCEDURE — G0103 PSA SCREENING: HCPCS | Performed by: PHYSICIAN ASSISTANT

## 2020-01-29 PROCEDURE — 80061 LIPID PANEL: CPT | Performed by: PHYSICIAN ASSISTANT

## 2020-01-29 PROCEDURE — 80053 COMPREHEN METABOLIC PANEL: CPT | Performed by: PHYSICIAN ASSISTANT

## 2020-01-29 PROCEDURE — 36415 COLL VENOUS BLD VENIPUNCTURE: CPT | Performed by: PHYSICIAN ASSISTANT

## 2020-01-29 PROCEDURE — 84443 ASSAY THYROID STIM HORMONE: CPT | Performed by: PHYSICIAN ASSISTANT

## 2020-02-11 ENCOUNTER — OFFICE VISIT (OUTPATIENT)
Dept: FAMILY MEDICINE CLINIC | Facility: CLINIC | Age: 55
End: 2020-02-11
Payer: COMMERCIAL

## 2020-02-11 VITALS
BODY MASS INDEX: 25.06 KG/M2 | SYSTOLIC BLOOD PRESSURE: 118 MMHG | HEIGHT: 72 IN | HEART RATE: 80 BPM | WEIGHT: 185 LBS | DIASTOLIC BLOOD PRESSURE: 78 MMHG

## 2020-02-11 DIAGNOSIS — N40.0 BENIGN PROSTATIC HYPERPLASIA, UNSPECIFIED WHETHER LOWER URINARY TRACT SYMPTOMS PRESENT: ICD-10-CM

## 2020-02-11 DIAGNOSIS — R53.83 OTHER FATIGUE: ICD-10-CM

## 2020-02-11 DIAGNOSIS — Z00.00 HEALTH CARE MAINTENANCE: Primary | ICD-10-CM

## 2020-02-11 DIAGNOSIS — R73.01 ELEVATED FASTING GLUCOSE: ICD-10-CM

## 2020-02-11 PROCEDURE — 3008F BODY MASS INDEX DOCD: CPT | Performed by: PHYSICIAN ASSISTANT

## 2020-02-11 PROCEDURE — 99396 PREV VISIT EST AGE 40-64: CPT | Performed by: PHYSICIAN ASSISTANT

## 2020-02-11 RX ORDER — TAMSULOSIN HYDROCHLORIDE 0.4 MG/1
0.4 CAPSULE ORAL
Qty: 30 CAPSULE | Refills: 5 | Status: SHIPPED | OUTPATIENT
Start: 2020-02-11 | End: 2021-01-04 | Stop reason: SDUPTHER

## 2020-02-11 RX ORDER — KETOCONAZOLE 20 MG/ML
SHAMPOO TOPICAL
COMMUNITY
Start: 2020-01-09 | End: 2021-01-04 | Stop reason: ALTCHOICE

## 2020-02-11 NOTE — PROGRESS NOTES
Assessment and Plan:  Patient Instructions   1  Health care maintenance-healthy appearing 70-year-old gentleman in reasonably good physical shape  Recommend continuing with physical exercise as tolerated  Recommend avoiding excessive fats and oils in the diet  Would encourage patient to get a flu vaccine next year  Patient is up-to-date on colon cancer screening  2  BPH-patient with some occasional symptoms  He would like to resume Flomax  Prescription was sent in to the pharmacy  His recent PSA was reviewed and normal at 1 4   3  Hallux valgus-patient continues to follow with podiatry and receive injections for this  Consider surgical options if it is continuing to limit his lifestyle  4  Fatigue/decreased libido-recommend assessing testosterone level with next labs  Problem List Items Addressed This Visit        Genitourinary    BPH (benign prostatic hyperplasia)    Relevant Medications    tamsulosin (FLOMAX) 0 4 mg    Other Relevant Orders    CBC and differential    Comprehensive metabolic panel    Lipid Panel with Direct LDL reflex    TSH, 3rd generation    PSA, Total Screen      Other Visit Diagnoses     Health care maintenance    -  Primary    Relevant Orders    CBC and differential    Comprehensive metabolic panel    Lipid Panel with Direct LDL reflex    TSH, 3rd generation    PSA, Total Screen    Elevated fasting glucose        Relevant Orders    Hemoglobin A1C    Other fatigue        Relevant Orders    Testosterone, free, total                 Diagnoses and all orders for this visit:    Health care maintenance  -     CBC and differential; Future  -     Comprehensive metabolic panel; Future  -     Lipid Panel with Direct LDL reflex; Future  -     TSH, 3rd generation; Future  -     PSA, Total Screen; Future    Benign prostatic hyperplasia, unspecified whether lower urinary tract symptoms present  -     CBC and differential; Future  -     Comprehensive metabolic panel;  Future  -     Lipid Panel with Direct LDL reflex; Future  -     TSH, 3rd generation; Future  -     PSA, Total Screen; Future  -     tamsulosin (FLOMAX) 0 4 mg; Take 1 capsule (0 4 mg total) by mouth daily with dinner    Elevated fasting glucose  -     Hemoglobin A1C; Future    Other fatigue  -     Testosterone, free, total; Future    Other orders  -     ketoconazole (NIZORAL) 2 % shampoo              Subjective:      Patient ID: Angela Osman is a 47 y o  male  CC:    Chief Complaint   Patient presents with    Physical Exam     General physical and review lab results  mjs       HPI:    HPI:  This is a 25-year-old gentleman that presents to the office for health maintenance physical   He is feeling well for the most part and has just had recent blood work which he would like to discuss  He has noticed that his sugar was slightly high fasting  He does have a glucometer at home and has been testing after meals  The highest reading he gets his around 170  He does have a family history of diabetes however  Patient also has a history of benign prostatic enlargement  He had been following with urology but his symptoms are mostly stable  He is now having some increased urinary symptoms and would like to go back on Flomax  Furthermore he has been seeing podiatry for a bunion  This has been limiting his activities  He has been getting injection therapies every 4 months or so and it seems to help for a while  He has noticed with his decreased activity that he is becoming a little bit more short of breath and tires from activities quicker than he used to  He is not having any chest pains however  The following portions of the patient's history were reviewed and updated as appropriate: allergies, current medications, past family history, past medical history, past social history, past surgical history and problem list       Review of Systems   Constitutional: Negative for chills, fatigue and fever     HENT: Negative for congestion, ear pain and sinus pressure  Eyes: Negative for visual disturbance  Respiratory: Negative for cough, chest tightness and shortness of breath  Cardiovascular: Negative for chest pain and palpitations  Gastrointestinal: Negative for diarrhea, nausea and vomiting  Endocrine: Negative for polyuria  Genitourinary: Negative for dysuria and frequency  Musculoskeletal: Negative for arthralgias and myalgias  Skin: Negative for pallor and rash  Neurological: Negative for dizziness, weakness, light-headedness, numbness and headaches  Psychiatric/Behavioral: Negative for agitation, behavioral problems and sleep disturbance  All other systems reviewed and are negative  Data to review:       Objective:    Vitals:    02/11/20 1517   BP: 118/78   Pulse: 80   Weight: 83 9 kg (185 lb)   Height: 6' (1 829 m)        Physical Exam   Constitutional: He is oriented to person, place, and time  He appears well-developed and well-nourished  No distress  HENT:   Head: Normocephalic and atraumatic  Right Ear: External ear normal    Left Ear: External ear normal    Nose: Nose normal    Mouth/Throat: Oropharynx is clear and moist  No oropharyngeal exudate  Eyes: Pupils are equal, round, and reactive to light  Conjunctivae and EOM are normal    Neck: Normal range of motion  Neck supple  No tracheal deviation present  No thyromegaly present  Cardiovascular: Normal rate, regular rhythm and normal heart sounds  Exam reveals no friction rub  No murmur heard  Pulmonary/Chest: Effort normal and breath sounds normal  No respiratory distress  He has no wheezes  He has no rales  Abdominal: Soft  Bowel sounds are normal  He exhibits no distension  There is no tenderness  There is no rebound and no guarding  Musculoskeletal: Normal range of motion  He exhibits no edema or tenderness  Lymphadenopathy:     He has no cervical adenopathy     Neurological: He is alert and oriented to person, place, and time  No cranial nerve deficit  Coordination normal    Skin: Skin is warm and dry  No rash noted  No erythema  Psychiatric: He has a normal mood and affect  His behavior is normal  Thought content normal    Nursing note and vitals reviewed  BMI Counseling: Body mass index is 25 09 kg/m²  The BMI is above normal  Nutrition recommendations include decreasing portion sizes  Exercise recommendations include exercising 3-5 times per week  BMI Counseling: Body mass index is 25 09 kg/m²  The BMI is above normal  Nutrition recommendations include 3-5 servings of fruits/vegetables daily

## 2020-02-11 NOTE — PATIENT INSTRUCTIONS
1  Health care maintenance-healthy appearing 49-year-old gentleman in reasonably good physical shape  Recommend continuing with physical exercise as tolerated  Recommend avoiding excessive fats and oils in the diet  Would encourage patient to get a flu vaccine next year  Patient is up-to-date on colon cancer screening  2  BPH-patient with some occasional symptoms  He would like to resume Flomax  Prescription was sent in to the pharmacy  His recent PSA was reviewed and normal at 1 4   3  Hallux valgus-patient continues to follow with podiatry and receive injections for this  Consider surgical options if it is continuing to limit his lifestyle  4  Fatigue/decreased libido-recommend assessing testosterone level with next labs

## 2020-12-02 ENCOUNTER — TELEPHONE (OUTPATIENT)
Dept: FAMILY MEDICINE CLINIC | Facility: CLINIC | Age: 55
End: 2020-12-02

## 2020-12-02 DIAGNOSIS — D70.9 NEUTROPENIA, UNSPECIFIED TYPE (HCC): ICD-10-CM

## 2020-12-02 DIAGNOSIS — F32.5 MAJOR DEPRESSIVE DISORDER WITH SINGLE EPISODE, IN FULL REMISSION (HCC): Primary | ICD-10-CM

## 2020-12-02 DIAGNOSIS — R53.83 OTHER FATIGUE: ICD-10-CM

## 2020-12-02 DIAGNOSIS — R73.01 ELEVATED FASTING GLUCOSE: ICD-10-CM

## 2020-12-02 DIAGNOSIS — N40.0 BENIGN PROSTATIC HYPERPLASIA, UNSPECIFIED WHETHER LOWER URINARY TRACT SYMPTOMS PRESENT: ICD-10-CM

## 2020-12-02 RX ORDER — CITALOPRAM 10 MG/1
10 TABLET ORAL DAILY
Qty: 30 TABLET | Refills: 0 | Status: SHIPPED | OUTPATIENT
Start: 2020-12-02 | End: 2020-12-30

## 2020-12-29 ENCOUNTER — LAB (OUTPATIENT)
Dept: LAB | Facility: CLINIC | Age: 55
End: 2020-12-29
Payer: COMMERCIAL

## 2020-12-29 DIAGNOSIS — N40.0 BENIGN PROSTATIC HYPERPLASIA, UNSPECIFIED WHETHER LOWER URINARY TRACT SYMPTOMS PRESENT: ICD-10-CM

## 2020-12-29 DIAGNOSIS — R53.83 OTHER FATIGUE: ICD-10-CM

## 2020-12-29 DIAGNOSIS — R73.01 ELEVATED FASTING GLUCOSE: ICD-10-CM

## 2020-12-29 DIAGNOSIS — F32.5 MAJOR DEPRESSIVE DISORDER WITH SINGLE EPISODE, IN FULL REMISSION (HCC): ICD-10-CM

## 2020-12-29 DIAGNOSIS — D70.9 NEUTROPENIA, UNSPECIFIED TYPE (HCC): ICD-10-CM

## 2020-12-29 LAB
ALBUMIN SERPL BCP-MCNC: 4 G/DL (ref 3.5–5)
ALP SERPL-CCNC: 53 U/L (ref 46–116)
ALT SERPL W P-5'-P-CCNC: 34 U/L (ref 12–78)
ANION GAP SERPL CALCULATED.3IONS-SCNC: 1 MMOL/L (ref 4–13)
AST SERPL W P-5'-P-CCNC: 20 U/L (ref 5–45)
BASOPHILS # BLD AUTO: 0.05 THOUSANDS/ΜL (ref 0–0.1)
BASOPHILS NFR BLD AUTO: 1 % (ref 0–1)
BILIRUB SERPL-MCNC: 0.59 MG/DL (ref 0.2–1)
BUN SERPL-MCNC: 17 MG/DL (ref 5–25)
CALCIUM SERPL-MCNC: 9.1 MG/DL (ref 8.3–10.1)
CHLORIDE SERPL-SCNC: 109 MMOL/L (ref 100–108)
CHOLEST SERPL-MCNC: 188 MG/DL (ref 50–200)
CO2 SERPL-SCNC: 32 MMOL/L (ref 21–32)
CREAT SERPL-MCNC: 1.08 MG/DL (ref 0.6–1.3)
EOSINOPHIL # BLD AUTO: 0.17 THOUSAND/ΜL (ref 0–0.61)
EOSINOPHIL NFR BLD AUTO: 4 % (ref 0–6)
ERYTHROCYTE [DISTWIDTH] IN BLOOD BY AUTOMATED COUNT: 12.4 % (ref 11.6–15.1)
EST. AVERAGE GLUCOSE BLD GHB EST-MCNC: 105 MG/DL
GFR SERPL CREATININE-BSD FRML MDRD: 77 ML/MIN/1.73SQ M
GLUCOSE P FAST SERPL-MCNC: 100 MG/DL (ref 65–99)
HBA1C MFR BLD: 5.3 %
HCT VFR BLD AUTO: 45.4 % (ref 36.5–49.3)
HDLC SERPL-MCNC: 64 MG/DL
HGB BLD-MCNC: 14.9 G/DL (ref 12–17)
IMM GRANULOCYTES # BLD AUTO: 0.01 THOUSAND/UL (ref 0–0.2)
IMM GRANULOCYTES NFR BLD AUTO: 0 % (ref 0–2)
LDLC SERPL CALC-MCNC: 110 MG/DL (ref 0–100)
LYMPHOCYTES # BLD AUTO: 1.63 THOUSANDS/ΜL (ref 0.6–4.47)
LYMPHOCYTES NFR BLD AUTO: 40 % (ref 14–44)
MCH RBC QN AUTO: 30.3 PG (ref 26.8–34.3)
MCHC RBC AUTO-ENTMCNC: 32.8 G/DL (ref 31.4–37.4)
MCV RBC AUTO: 92 FL (ref 82–98)
MONOCYTES # BLD AUTO: 0.4 THOUSAND/ΜL (ref 0.17–1.22)
MONOCYTES NFR BLD AUTO: 10 % (ref 4–12)
NEUTROPHILS # BLD AUTO: 1.86 THOUSANDS/ΜL (ref 1.85–7.62)
NEUTS SEG NFR BLD AUTO: 45 % (ref 43–75)
NRBC BLD AUTO-RTO: 0 /100 WBCS
PLATELET # BLD AUTO: 201 THOUSANDS/UL (ref 149–390)
PMV BLD AUTO: 10.1 FL (ref 8.9–12.7)
POTASSIUM SERPL-SCNC: 4.3 MMOL/L (ref 3.5–5.3)
PROT SERPL-MCNC: 7.2 G/DL (ref 6.4–8.2)
PSA SERPL-MCNC: 1.9 NG/ML (ref 0–4)
RBC # BLD AUTO: 4.92 MILLION/UL (ref 3.88–5.62)
SODIUM SERPL-SCNC: 142 MMOL/L (ref 136–145)
TRIGL SERPL-MCNC: 72 MG/DL
TSH SERPL DL<=0.05 MIU/L-ACNC: 2.37 UIU/ML (ref 0.36–3.74)
WBC # BLD AUTO: 4.12 THOUSAND/UL (ref 4.31–10.16)

## 2020-12-29 PROCEDURE — 84402 ASSAY OF FREE TESTOSTERONE: CPT

## 2020-12-29 PROCEDURE — G0103 PSA SCREENING: HCPCS

## 2020-12-29 PROCEDURE — 36415 COLL VENOUS BLD VENIPUNCTURE: CPT

## 2020-12-29 PROCEDURE — 80061 LIPID PANEL: CPT

## 2020-12-29 PROCEDURE — 80053 COMPREHEN METABOLIC PANEL: CPT

## 2020-12-29 PROCEDURE — 83036 HEMOGLOBIN GLYCOSYLATED A1C: CPT

## 2020-12-29 PROCEDURE — 84403 ASSAY OF TOTAL TESTOSTERONE: CPT

## 2020-12-29 PROCEDURE — 85025 COMPLETE CBC W/AUTO DIFF WBC: CPT

## 2020-12-29 PROCEDURE — 84443 ASSAY THYROID STIM HORMONE: CPT

## 2020-12-30 DIAGNOSIS — F32.5 MAJOR DEPRESSIVE DISORDER WITH SINGLE EPISODE, IN FULL REMISSION (HCC): ICD-10-CM

## 2020-12-30 LAB
TESTOST FREE SERPL-MCNC: 12.3 PG/ML (ref 7.2–24)
TESTOST SERPL-MCNC: 632 NG/DL (ref 264–916)

## 2020-12-30 RX ORDER — CITALOPRAM 10 MG/1
TABLET ORAL
Qty: 30 TABLET | Refills: 0 | Status: SHIPPED | OUTPATIENT
Start: 2020-12-30 | End: 2021-01-04 | Stop reason: SDUPTHER

## 2021-01-04 ENCOUNTER — OFFICE VISIT (OUTPATIENT)
Dept: FAMILY MEDICINE CLINIC | Facility: CLINIC | Age: 56
End: 2021-01-04
Payer: COMMERCIAL

## 2021-01-04 VITALS
DIASTOLIC BLOOD PRESSURE: 70 MMHG | SYSTOLIC BLOOD PRESSURE: 120 MMHG | WEIGHT: 185 LBS | HEART RATE: 64 BPM | TEMPERATURE: 97.6 F | RESPIRATION RATE: 18 BRPM | BODY MASS INDEX: 25.06 KG/M2 | HEIGHT: 72 IN

## 2021-01-04 DIAGNOSIS — N40.0 BENIGN PROSTATIC HYPERPLASIA, UNSPECIFIED WHETHER LOWER URINARY TRACT SYMPTOMS PRESENT: ICD-10-CM

## 2021-01-04 DIAGNOSIS — M20.12 HALLUX VALGUS, LEFT: ICD-10-CM

## 2021-01-04 DIAGNOSIS — F41.9 ANXIETY: Primary | ICD-10-CM

## 2021-01-04 DIAGNOSIS — F32.5 MAJOR DEPRESSIVE DISORDER WITH SINGLE EPISODE, IN FULL REMISSION (HCC): ICD-10-CM

## 2021-01-04 PROCEDURE — 1036F TOBACCO NON-USER: CPT | Performed by: PHYSICIAN ASSISTANT

## 2021-01-04 PROCEDURE — 3008F BODY MASS INDEX DOCD: CPT | Performed by: PHYSICIAN ASSISTANT

## 2021-01-04 PROCEDURE — 99214 OFFICE O/P EST MOD 30 MIN: CPT | Performed by: PHYSICIAN ASSISTANT

## 2021-01-04 PROCEDURE — 3725F SCREEN DEPRESSION PERFORMED: CPT | Performed by: PHYSICIAN ASSISTANT

## 2021-01-04 RX ORDER — CITALOPRAM 10 MG/1
10 TABLET ORAL DAILY
Qty: 30 TABLET | Refills: 5 | Status: SHIPPED | OUTPATIENT
Start: 2021-01-04 | End: 2021-12-20 | Stop reason: SDUPTHER

## 2021-01-04 RX ORDER — TAMSULOSIN HYDROCHLORIDE 0.4 MG/1
0.4 CAPSULE ORAL
Qty: 30 CAPSULE | Refills: 5 | Status: SHIPPED | OUTPATIENT
Start: 2021-01-04 | End: 2022-02-07 | Stop reason: SDUPTHER

## 2021-01-04 NOTE — PROGRESS NOTES
Assessment and Plan:  Patient Instructions   Assessment/plan:  1  Anxiety-presently stable with citalopram 10 mg daily  Recommend continuing at least 6-12 months, then re-evaluating  2  Mild Major depressive episode- patient is in remission was citalopram 10 mg daily  This seems to be a 2nd recurrence of episode  Would encourage continued medication use at least 1 year if not longer  3  BPH-presently stable with Flomax as necessary, no medication changes  4  Hallux valgus-patient continues to follow with podiatry  Problem List Items Addressed This Visit        Genitourinary    BPH (benign prostatic hyperplasia)    Relevant Medications    tamsulosin (FLOMAX) 0 4 mg       Other    Depression    Relevant Medications    citalopram (CeleXA) 10 mg tablet      Other Visit Diagnoses     Anxiety    -  Primary    Hallux valgus, left                     Diagnoses and all orders for this visit:    Anxiety    Major depressive disorder with single episode, in full remission (HCC)  -     citalopram (CeleXA) 10 mg tablet; Take 1 tablet (10 mg total) by mouth daily    Benign prostatic hyperplasia, unspecified whether lower urinary tract symptoms present  -     tamsulosin (FLOMAX) 0 4 mg; Take 1 capsule (0 4 mg total) by mouth daily with dinner    Hallux valgus, left              Subjective:      Patient ID: Earl Auguste is a 54 y o  male  CC:    Chief Complaint   Patient presents with    Follow-up       HPI:    HPI:  This is a 49-year-old gentleman that presents to the office for follow-up of chronic health conditions and recent blood work  He has been feeling well since he restarted citalopram a few months ago  He was feeling some very mild depression, but more anxiety symptoms  Since he has been back on the 10 mg daily he feel that has made a significant benefit and he is feeling that he is in near complete remission  He would like to continue the medication for another 6 months at least and re-evaluate    He also has a history of BPH and continues to use Flomax for symptoms as necessary  The following portions of the patient's history were reviewed and updated as appropriate: allergies, current medications, past family history, past medical history, past social history, past surgical history and problem list       Review of Systems   Constitutional: Negative for chills, fatigue and fever  HENT: Negative for congestion, ear pain and sinus pressure  Eyes: Negative for visual disturbance  Respiratory: Negative for cough, chest tightness and shortness of breath  Cardiovascular: Negative for chest pain and palpitations  Gastrointestinal: Negative for diarrhea, nausea and vomiting  Endocrine: Negative for polyuria  Genitourinary: Negative for dysuria and frequency  Musculoskeletal: Negative for arthralgias and myalgias  Skin: Negative for pallor and rash  Neurological: Negative for dizziness, weakness, light-headedness, numbness and headaches  Psychiatric/Behavioral: Negative for agitation, behavioral problems and sleep disturbance  All other systems reviewed and are negative  Data to review:       Objective:    Vitals:    01/04/21 1300   BP: 120/70   BP Location: Left arm   Patient Position: Sitting   Cuff Size: Adult   Pulse: 64   Resp: 18   Temp: 97 6 °F (36 4 °C)   TempSrc: Tympanic   Weight: 83 9 kg (185 lb)   Height: 6' (1 829 m)        Physical Exam  Constitutional:       General: He is not in acute distress  Appearance: He is well-developed  HENT:      Head: Normocephalic and atraumatic  Right Ear: Tympanic membrane normal       Left Ear: Tympanic membrane normal    Eyes:      Conjunctiva/sclera: Conjunctivae normal    Neck:      Musculoskeletal: Normal range of motion  Cardiovascular:      Rate and Rhythm: Normal rate and regular rhythm  Pulmonary:      Effort: Pulmonary effort is normal    Abdominal:      General: Abdomen is flat   Bowel sounds are normal  There is no distension  Palpations: Abdomen is soft  There is no mass  Musculoskeletal: Normal range of motion  Skin:     General: Skin is warm  Findings: No rash  Neurological:      Mental Status: He is alert and oriented to person, place, and time  Psychiatric:         Mood and Affect: Mood normal            BMI Counseling: Body mass index is 25 09 kg/m²  The BMI is above normal  Nutrition recommendations include decreasing portion sizes  Exercise recommendations include exercising 3-5 times per week  BMI Counseling: Body mass index is 25 09 kg/m²  The BMI is above normal  Nutrition recommendations include reducing portion sizes

## 2021-01-04 NOTE — PATIENT INSTRUCTIONS
Assessment/plan:  1  Anxiety-presently stable with citalopram 10 mg daily  Recommend continuing at least 6-12 months, then re-evaluating  2  Mild Major depressive episode- patient is in remission was citalopram 10 mg daily  This seems to be a 2nd recurrence of episode  Would encourage continued medication use at least 1 year if not longer  3  BPH-presently stable with Flomax as necessary, no medication changes  4  Hallux valgus-patient continues to follow with podiatry

## 2021-04-08 DIAGNOSIS — Z23 ENCOUNTER FOR IMMUNIZATION: ICD-10-CM

## 2021-12-20 ENCOUNTER — TELEPHONE (OUTPATIENT)
Dept: FAMILY MEDICINE CLINIC | Facility: CLINIC | Age: 56
End: 2021-12-20

## 2021-12-20 DIAGNOSIS — F32.5 MAJOR DEPRESSIVE DISORDER WITH SINGLE EPISODE, IN FULL REMISSION (HCC): ICD-10-CM

## 2021-12-20 DIAGNOSIS — R53.83 OTHER FATIGUE: ICD-10-CM

## 2021-12-20 DIAGNOSIS — Z12.5 SCREENING FOR MALIGNANT NEOPLASM OF PROSTATE: Primary | ICD-10-CM

## 2021-12-20 DIAGNOSIS — D70.9 NEUTROPENIA, UNSPECIFIED TYPE (HCC): ICD-10-CM

## 2021-12-20 RX ORDER — CITALOPRAM 10 MG/1
10 TABLET ORAL DAILY
Qty: 30 TABLET | Refills: 5 | Status: SHIPPED | OUTPATIENT
Start: 2021-12-20

## 2022-02-01 ENCOUNTER — APPOINTMENT (OUTPATIENT)
Dept: LAB | Facility: CLINIC | Age: 57
End: 2022-02-01
Payer: COMMERCIAL

## 2022-02-01 LAB
ALBUMIN SERPL BCP-MCNC: 3.9 G/DL (ref 3.5–5)
ALP SERPL-CCNC: 53 U/L (ref 46–116)
ALT SERPL W P-5'-P-CCNC: 25 U/L (ref 12–78)
ANION GAP SERPL CALCULATED.3IONS-SCNC: 5 MMOL/L (ref 4–13)
AST SERPL W P-5'-P-CCNC: 19 U/L (ref 5–45)
BASOPHILS # BLD AUTO: 0.04 THOUSANDS/ΜL (ref 0–0.1)
BASOPHILS NFR BLD AUTO: 1 % (ref 0–1)
BILIRUB SERPL-MCNC: 0.58 MG/DL (ref 0.2–1)
BUN SERPL-MCNC: 13 MG/DL (ref 5–25)
CALCIUM SERPL-MCNC: 8.9 MG/DL (ref 8.3–10.1)
CHLORIDE SERPL-SCNC: 108 MMOL/L (ref 100–108)
CHOLEST SERPL-MCNC: 175 MG/DL
CO2 SERPL-SCNC: 29 MMOL/L (ref 21–32)
CREAT SERPL-MCNC: 1.1 MG/DL (ref 0.6–1.3)
EOSINOPHIL # BLD AUTO: 0.17 THOUSAND/ΜL (ref 0–0.61)
EOSINOPHIL NFR BLD AUTO: 4 % (ref 0–6)
ERYTHROCYTE [DISTWIDTH] IN BLOOD BY AUTOMATED COUNT: 12.3 % (ref 11.6–15.1)
GFR SERPL CREATININE-BSD FRML MDRD: 74 ML/MIN/1.73SQ M
GLUCOSE P FAST SERPL-MCNC: 102 MG/DL (ref 65–99)
HCT VFR BLD AUTO: 45.6 % (ref 36.5–49.3)
HDLC SERPL-MCNC: 48 MG/DL
HGB BLD-MCNC: 15 G/DL (ref 12–17)
IMM GRANULOCYTES # BLD AUTO: 0.01 THOUSAND/UL (ref 0–0.2)
IMM GRANULOCYTES NFR BLD AUTO: 0 % (ref 0–2)
LDLC SERPL CALC-MCNC: 111 MG/DL (ref 0–100)
LYMPHOCYTES # BLD AUTO: 1.33 THOUSANDS/ΜL (ref 0.6–4.47)
LYMPHOCYTES NFR BLD AUTO: 33 % (ref 14–44)
MCH RBC QN AUTO: 30.4 PG (ref 26.8–34.3)
MCHC RBC AUTO-ENTMCNC: 32.9 G/DL (ref 31.4–37.4)
MCV RBC AUTO: 92 FL (ref 82–98)
MONOCYTES # BLD AUTO: 0.45 THOUSAND/ΜL (ref 0.17–1.22)
MONOCYTES NFR BLD AUTO: 11 % (ref 4–12)
NEUTROPHILS # BLD AUTO: 1.99 THOUSANDS/ΜL (ref 1.85–7.62)
NEUTS SEG NFR BLD AUTO: 51 % (ref 43–75)
NRBC BLD AUTO-RTO: 0 /100 WBCS
PLATELET # BLD AUTO: 220 THOUSANDS/UL (ref 149–390)
PMV BLD AUTO: 10.3 FL (ref 8.9–12.7)
POTASSIUM SERPL-SCNC: 4.2 MMOL/L (ref 3.5–5.3)
PROT SERPL-MCNC: 7.4 G/DL (ref 6.4–8.2)
PSA SERPL-MCNC: 1.8 NG/ML (ref 0–4)
RBC # BLD AUTO: 4.94 MILLION/UL (ref 3.88–5.62)
SODIUM SERPL-SCNC: 142 MMOL/L (ref 136–145)
TRIGL SERPL-MCNC: 78 MG/DL
TSH SERPL DL<=0.05 MIU/L-ACNC: 2.04 UIU/ML (ref 0.36–3.74)
WBC # BLD AUTO: 3.99 THOUSAND/UL (ref 4.31–10.16)

## 2022-02-01 PROCEDURE — G0103 PSA SCREENING: HCPCS | Performed by: PHYSICIAN ASSISTANT

## 2022-02-01 PROCEDURE — 84443 ASSAY THYROID STIM HORMONE: CPT | Performed by: PHYSICIAN ASSISTANT

## 2022-02-01 PROCEDURE — 80061 LIPID PANEL: CPT | Performed by: PHYSICIAN ASSISTANT

## 2022-02-01 PROCEDURE — 85025 COMPLETE CBC W/AUTO DIFF WBC: CPT | Performed by: PHYSICIAN ASSISTANT

## 2022-02-01 PROCEDURE — 86618 LYME DISEASE ANTIBODY: CPT | Performed by: PHYSICIAN ASSISTANT

## 2022-02-01 PROCEDURE — 36415 COLL VENOUS BLD VENIPUNCTURE: CPT | Performed by: PHYSICIAN ASSISTANT

## 2022-02-01 PROCEDURE — 80053 COMPREHEN METABOLIC PANEL: CPT | Performed by: PHYSICIAN ASSISTANT

## 2022-02-02 LAB — B BURGDOR IGG+IGM SER-ACNC: 42

## 2022-02-07 ENCOUNTER — OFFICE VISIT (OUTPATIENT)
Dept: FAMILY MEDICINE CLINIC | Facility: CLINIC | Age: 57
End: 2022-02-07
Payer: COMMERCIAL

## 2022-02-07 VITALS
SYSTOLIC BLOOD PRESSURE: 110 MMHG | HEIGHT: 72 IN | HEART RATE: 64 BPM | DIASTOLIC BLOOD PRESSURE: 70 MMHG | BODY MASS INDEX: 26.14 KG/M2 | WEIGHT: 193 LBS

## 2022-02-07 DIAGNOSIS — N40.0 BENIGN PROSTATIC HYPERPLASIA, UNSPECIFIED WHETHER LOWER URINARY TRACT SYMPTOMS PRESENT: ICD-10-CM

## 2022-02-07 DIAGNOSIS — N52.9 ERECTILE DYSFUNCTION, UNSPECIFIED ERECTILE DYSFUNCTION TYPE: Primary | ICD-10-CM

## 2022-02-07 PROCEDURE — 3008F BODY MASS INDEX DOCD: CPT | Performed by: PHYSICIAN ASSISTANT

## 2022-02-07 PROCEDURE — 1036F TOBACCO NON-USER: CPT | Performed by: PHYSICIAN ASSISTANT

## 2022-02-07 PROCEDURE — 99214 OFFICE O/P EST MOD 30 MIN: CPT | Performed by: PHYSICIAN ASSISTANT

## 2022-02-07 RX ORDER — TAMSULOSIN HYDROCHLORIDE 0.4 MG/1
0.4 CAPSULE ORAL
Qty: 30 CAPSULE | Refills: 5 | Status: SHIPPED | OUTPATIENT
Start: 2022-02-07 | End: 2022-04-01 | Stop reason: ALTCHOICE

## 2022-02-07 RX ORDER — TADALAFIL 20 MG/1
20 TABLET ORAL DAILY PRN
Qty: 30 TABLET | Refills: 5 | Status: SHIPPED | OUTPATIENT
Start: 2022-02-07

## 2022-02-07 RX ORDER — VITAMIN B COMPLEX
1 CAPSULE ORAL DAILY
COMMUNITY

## 2022-02-07 RX ORDER — DICLOFENAC SODIUM 75 MG/1
75 TABLET, DELAYED RELEASE ORAL 2 TIMES DAILY PRN
COMMUNITY
Start: 2021-07-26 | End: 2022-07-26

## 2022-02-07 NOTE — PROGRESS NOTES
Assessment and Plan:  Patient Instructions     Assessment/plan:  1  Hallux rigidus -patient stable for surgery as planned on the 25th of February with Dr Diana Goff  Labs reviewed  2   Erectile dysfunction-continue Cialis as necessary  Prescription given  3   Depression -stable on remission on citalopram, no medication changes  4   Celiac disease -stable with gluten avoidance  Problem List Items Addressed This Visit        Genitourinary    BPH (benign prostatic hyperplasia)    Relevant Medications    tamsulosin (FLOMAX) 0 4 mg      Other Visit Diagnoses     Erectile dysfunction, unspecified erectile dysfunction type    -  Primary    Relevant Medications    tadalafil (CIALIS) 20 MG tablet                 Diagnoses and all orders for this visit:    Erectile dysfunction, unspecified erectile dysfunction type  -     tadalafil (CIALIS) 20 MG tablet; Take 1 tablet (20 mg total) by mouth daily as needed for erectile dysfunction    Benign prostatic hyperplasia, unspecified whether lower urinary tract symptoms present  -     tamsulosin (FLOMAX) 0 4 mg; Take 1 capsule (0 4 mg total) by mouth daily with dinner    Other orders  -     diclofenac (VOLTAREN) 75 mg EC tablet; Take 75 mg by mouth 2 (two) times a day as needed  -     S-Adenosylmethionine (AMERICO-E PO); Take by mouth  -     b complex vitamins capsule; Take 1 capsule by mouth daily  -     Magnesium Gluconate (MAGNESIUM 27 PO); Take by mouth              Subjective:      Patient ID: Charlene Corley is a 64 y o  male  CC:    Chief Complaint   Patient presents with    Pre-op Exam     patient is here for a pre op exam  patient having right bunionectomy on 2/25/22 by [facility]       HPI:    HPI:  This is a 70-year-old gentleman that presents to the office for preop consultation for toe pain  He will be having surgical intervention on the 25th of February with Dr Diana Goff  Patient has had recent blood work which he is here to review as well    He has been feeling well without any acute complaints  He does continue on citalopram for depression which has been in remission  He has allergy to latex and also use is Cialis as needed for erectile dysfunction  The following portions of the patient's history were reviewed and updated as appropriate: allergies, current medications, past family history, past medical history, past social history, past surgical history and problem list       Review of Systems   Constitutional: Negative for chills, fatigue and fever  HENT: Negative for congestion, ear pain and sinus pressure  Eyes: Negative for visual disturbance  Respiratory: Negative for cough, chest tightness and shortness of breath  Cardiovascular: Negative for chest pain and palpitations  Gastrointestinal: Negative for diarrhea, nausea and vomiting  Endocrine: Negative for polyuria  Genitourinary: Negative for dysuria and frequency  Musculoskeletal: Negative for arthralgias and myalgias  Skin: Negative for pallor and rash  Neurological: Negative for dizziness, weakness, light-headedness, numbness and headaches  Psychiatric/Behavioral: Negative for agitation, behavioral problems and sleep disturbance  All other systems reviewed and are negative  Data to review:       Objective:    Vitals:    02/07/22 1602   BP: 110/70   Pulse: 64   Weight: 87 5 kg (193 lb)   Height: 6' (1 829 m)        Physical Exam  Constitutional:       General: He is not in acute distress  Appearance: He is well-developed  HENT:      Head: Normocephalic and atraumatic  Right Ear: Tympanic membrane normal       Left Ear: Tympanic membrane normal    Eyes:      Conjunctiva/sclera: Conjunctivae normal    Cardiovascular:      Rate and Rhythm: Normal rate and regular rhythm  Pulmonary:      Effort: Pulmonary effort is normal    Abdominal:      General: Abdomen is flat  Bowel sounds are normal  There is no distension  Palpations: Abdomen is soft  There is no mass  Musculoskeletal:         General: Normal range of motion  Cervical back: Normal range of motion  Skin:     General: Skin is warm  Findings: No rash  Neurological:      Mental Status: He is alert and oriented to person, place, and time  Psychiatric:         Mood and Affect: Mood normal            BMI Counseling: Body mass index is 26 18 kg/m²  The BMI is above normal  Nutrition recommendations include decreasing portion sizes  Exercise recommendations include exercising 3-5 times per week  Rationale for BMI follow-up plan is due to patient being overweight or obese

## 2022-02-07 NOTE — PATIENT INSTRUCTIONS
Assessment/plan:  1  Hallux rigidus -patient stable for surgery as planned on the 25th of February with Dr Radha Weeks  Labs reviewed  2   Erectile dysfunction-continue Cialis as necessary  Prescription given  3   Depression -stable on remission on citalopram, no medication changes  4   Celiac disease -stable with gluten avoidance

## 2022-02-07 NOTE — PROGRESS NOTES
Presurgical Evaluation    Subjective:      Patient ID: Shamar Collins is a 64 y o  male  Chief Complaint   Patient presents with    Pre-op Exam     patient is here for a pre op exam  patient having right bunionectomy on 2/25/22 by [facility]         HPI:  This is a 80-year-old gentleman that presents to the office for preop consultation for toe pain  He will be having surgical intervention on the 25th of February with Dr Lucio Arriola  Patient has had recent blood work which he is here to review as well  He has been feeling well without any acute complaints  He does continue on citalopram for depression which has been in remission  He has allergy to latex and also use is Cialis as needed for erectile dysfunction  {Common ambulatory SmartLinks:93633}    Procedure date: {DATE:23014}    Surgeon:  ***  Planned procedure:  ***  Diagnosis for procedure:  ***    Prior anesthesia: {[facility] ANESTHSIA :8102836726}    [facility]   NOTE: Patient should see Cardiology if time available before surgery, and if appropriate  Pulmonary History: {[facility] PULM HX:3281721186}    Renal history: {[facility] Renal Hx:8259352262}    Diabetes History:  {[facility] DIABETES TYPE:8049881695}     Neurological History: {[facility] neuro:2499990860}     On Immunosuppressant meds/biologics: {[facility] IMMUNOSUPRESSANTS:6182477637}      Review of Systems   Constitutional: Negative for chills, fatigue and fever  HENT: Negative for congestion, ear pain and sinus pressure  Eyes: Negative for visual disturbance  Respiratory: Negative for cough, chest tightness and shortness of breath  Cardiovascular: Negative for chest pain and palpitations  Gastrointestinal: Negative for diarrhea, nausea and vomiting  Endocrine: Negative for polyuria  Genitourinary: Negative for dysuria and frequency  Musculoskeletal: Negative for arthralgias and myalgias  Skin: Negative for pallor and rash     Neurological: Negative for dizziness, weakness, light-headedness, numbness and headaches  Psychiatric/Behavioral: Negative for agitation, behavioral problems and sleep disturbance  All other systems reviewed and are negative  Current Outpatient Medications   Medication Sig Dispense Refill    b complex vitamins capsule Take 1 capsule by mouth daily      citalopram (CeleXA) 10 mg tablet Take 1 tablet (10 mg total) by mouth daily 30 tablet 5    diclofenac (VOLTAREN) 75 mg EC tablet Take 75 mg by mouth 2 (two) times a day as needed      Magnesium Gluconate (MAGNESIUM 27 PO) Take by mouth      S-Adenosylmethionine (AMERICO-E PO) Take by mouth      tamsulosin (FLOMAX) 0 4 mg Take 1 capsule (0 4 mg total) by mouth daily with dinner (Patient taking differently: Take 0 4 mg by mouth daily with dinner PRN ) 30 capsule 5     No current facility-administered medications for this visit  Allergies on file:   Glyceryl monolaurate and Latex    Patient Active Problem List   Diagnosis    Depression    BPH (benign prostatic hyperplasia)    Celiac disease        No past medical history on file  Past Surgical History:   Procedure Laterality Date    WISDOM TOOTH EXTRACTION         Family History   Problem Relation Age of Onset    Diabetes Mother     Heart disease Mother     Prostate cancer Father        Social History     Tobacco Use    Smoking status: Former Smoker    Smokeless tobacco: Former User   Substance Use Topics    Alcohol use: Yes     Comment: moderate    Drug use: Not Currently       Objective:    Vitals:    02/07/22 1602   BP: 110/70   Pulse: 64   Weight: 87 5 kg (193 lb)   Height: 6' (1 829 m)        Physical Exam  Constitutional:       Appearance: He is well-developed  HENT:      Head: Normocephalic  Cardiovascular:      Rate and Rhythm: Normal rate and regular rhythm  Pulmonary:      Effort: Pulmonary effort is normal  No respiratory distress  Breath sounds: Normal breath sounds     Abdominal: Palpations: Abdomen is soft  Musculoskeletal:         General: Normal range of motion  Neurological:      Mental Status: He is alert and oriented to person, place, and time  Preop labs/testing available and reviewed: {YES/NO:}    eGFR   Date Value Ref Range Status   2022 74 ml/min/1 73sq m Final     WBC   Date Value Ref Range Status   2022 3 99 (L) 4 31 - 10 16 Thousand/uL Final          EKG {YES/NO:}    Echo {YES/NO:}    Stress test/cath {YES/NO:}    PFT/Singh {YES/NO:}    Functional capacity: {sl amb metabolic EPPNWXGJZVX:4354471641}   Pick the highest level patient can comfortably perform   4 mets or greater for surgery    RCRI  High Risk surgery? 1 Point  CAD History:         1 Point   MI; Positive Stress Test; CP due to Mi;  Nitrate Usage to control Angina; Pathologic Q wave on EKG  CHF Active:         1 Point   Pulm Edema; Paroxysmal Nocturnal Dyspnea;  Bibasilar Rales (crackles);S3; CHF on CXR  Cerebrovascular Disease (TIA or CVA):     1 Point  DM on Insulin:        1 Point  Serum Creat >2 0 mg/dl:       1 Point          Total Points: {Numbers; 9-0:07384}     Scorin: Class I, Very Low Risk (0 4%)     1: Class II, Low risk (0 9%)     2: Class III Moderate (6 6%)     3: Class IV High (>11%)      CHUCK Risk:  GFR:   eGFR   Date Value Ref Range Status   2022 74 ml/min/1 73sq m Final         For PCP:  If GFR>60, Hold ACE/ARB/Diuretic on the day of surgery, and NSAIDS 10 days before  If GFR<45, Consider PRE and POST op Nephrology Consult  If 46 <GFR> 59 : Has Patient had CHUCK in last 6 Months? {YES/NO:}   If YES: Preop Nephrology consult   If No:  Deborah 26 Nephrology consult  Assessment/Plan:    Patient {is/is not:} medically optimized (cleared) for the planned procedure  Further testing/evaluation {is/is not:} required      Postop concerns: {YES/NO:}    Problem List Items Addressed This Visit     None {Assess/PlanSmartLinks:56034}      Pre-Surgery Instructions:   Medication Instructions    b complex vitamins capsule {OR PAT MED INSTRUCTIONS:58052}    citalopram (CeleXA) 10 mg tablet {OR PAT MED INSTRUCTIONS:43056}    diclofenac (VOLTAREN) 75 mg EC tablet {OR PAT MED INSTRUCTIONS:92540}    Magnesium Gluconate (MAGNESIUM 27 PO) {OR PAT MED INSTRUCTIONS:02744}    S-Adenosylmethionine (AMERICO-E PO) {OR PAT MED INSTRUCTIONS:71272}    tamsulosin (FLOMAX) 0 4 mg {OR PAT MED INSTRUCTIONS:66055}        NOTE: Please use the above to review important meds for your specialty, the remainder "per anesthesia Guidelines "    NOTE: Please place an Inbasket message for "[facility]" pool for complicated patients  BMI Counseling: Body mass index is 26 18 kg/m²   The BMI {VB BMI Counselin}

## 2022-04-01 ENCOUNTER — OFFICE VISIT (OUTPATIENT)
Dept: FAMILY MEDICINE CLINIC | Facility: CLINIC | Age: 57
End: 2022-04-01
Payer: COMMERCIAL

## 2022-04-01 VITALS
BODY MASS INDEX: 26.73 KG/M2 | TEMPERATURE: 97.4 F | WEIGHT: 197.38 LBS | DIASTOLIC BLOOD PRESSURE: 64 MMHG | SYSTOLIC BLOOD PRESSURE: 112 MMHG | HEIGHT: 72 IN

## 2022-04-01 DIAGNOSIS — M20.20 HALLUX RIGIDUS, UNSPECIFIED LATERALITY: Primary | ICD-10-CM

## 2022-04-01 PROCEDURE — 3008F BODY MASS INDEX DOCD: CPT | Performed by: PHYSICIAN ASSISTANT

## 2022-04-01 PROCEDURE — 99213 OFFICE O/P EST LOW 20 MIN: CPT | Performed by: PHYSICIAN ASSISTANT

## 2022-04-01 PROCEDURE — 1036F TOBACCO NON-USER: CPT | Performed by: PHYSICIAN ASSISTANT

## 2022-04-01 NOTE — PROGRESS NOTES
Assessment and Plan:  Patient Instructions     Assessment/plan:  1  Hallux rigidus- patient continues without any new cardiac symptoms or signs of infection  He is medically stable for planned procedure next week with Dr Rere Walsh  2   Depression-presently stable on citalopram, no medication changes  Problem List Items Addressed This Visit     None                 There are no diagnoses linked to this encounter  Subjective:      Patient ID: Abby Posadas is a 64 y o  male  CC:    Chief Complaint   Patient presents with    Pre-op Exam     pt is having bunion surgery on 4/6   b       HPI:      HPI:  This is a 49-year-old gentleman that presents to the office for update to preop consultation performed about 2 months ago  He is planning to have hallux rigidus surgery with Dr Lucero Eng  Next week  The surgery had been rescheduled from February  He has not had any new cardiac symptoms and denies any chest pains, shortness of breath, or other signs of infection  The following portions of the patient's history were reviewed and updated as appropriate: allergies, current medications, past family history, past medical history, past social history, past surgical history and problem list       Review of Systems   Constitutional: Negative for chills, fatigue and fever  HENT: Negative for congestion, ear pain and sinus pressure  Eyes: Negative for visual disturbance  Respiratory: Negative for cough, chest tightness and shortness of breath  Cardiovascular: Negative for chest pain and palpitations  Gastrointestinal: Negative for diarrhea, nausea and vomiting  Endocrine: Negative for polyuria  Genitourinary: Negative for dysuria and frequency  Musculoskeletal: Negative for arthralgias and myalgias  Skin: Negative for pallor and rash  Neurological: Negative for dizziness, weakness, light-headedness, numbness and headaches     Psychiatric/Behavioral: Negative for agitation, behavioral problems and sleep disturbance  All other systems reviewed and are negative  Data to review:       Objective:    Vitals:    04/01/22 1328   BP: 112/64   BP Location: Left arm   Patient Position: Sitting   Cuff Size: Large   Temp: (!) 97 4 °F (36 3 °C)   TempSrc: Temporal   Weight: 89 5 kg (197 lb 6 oz)   Height: 6' (1 829 m)        Physical Exam  Constitutional:       General: He is not in acute distress  Appearance: He is well-developed  HENT:      Head: Normocephalic and atraumatic  Right Ear: Tympanic membrane normal       Left Ear: Tympanic membrane normal    Eyes:      Conjunctiva/sclera: Conjunctivae normal    Cardiovascular:      Rate and Rhythm: Normal rate and regular rhythm  Pulmonary:      Effort: Pulmonary effort is normal    Abdominal:      General: Abdomen is flat  Bowel sounds are normal  There is no distension  Palpations: Abdomen is soft  There is no mass  Musculoskeletal:         General: Normal range of motion  Cervical back: Normal range of motion  Skin:     General: Skin is warm  Findings: No rash  Neurological:      Mental Status: He is alert and oriented to person, place, and time     Psychiatric:         Mood and Affect: Mood normal

## 2022-04-01 NOTE — PATIENT INSTRUCTIONS
Assessment/plan:  1  Hallux rigidus- patient continues without any new cardiac symptoms or signs of infection  He is medically stable for planned procedure next week with Dr Jonas Bowles  2   Depression-presently stable on citalopram, no medication changes

## 2022-04-01 NOTE — H&P (VIEW-ONLY)
Assessment and Plan:  Patient Instructions     Assessment/plan:  1  Hallux rigidus- patient continues without any new cardiac symptoms or signs of infection  He is medically stable for planned procedure next week with Dr Berny Dang  2   Depression-presently stable on citalopram, no medication changes  Problem List Items Addressed This Visit     None                 There are no diagnoses linked to this encounter  Subjective:      Patient ID: Meggan Ruiz is a 64 y o  male  CC:    Chief Complaint   Patient presents with    Pre-op Exam     [place]   [facility] [place]:      HPI:  This is a 63-year-old gentleman that presents to the office for update to preop consultation performed about 2 months ago  He is planning to have hallux rigidus surgery with Dr Vianney Azevedo  Next week  The surgery had been rescheduled from February  He has not had any new cardiac symptoms and denies any chest pains, shortness of breath, or other signs of infection  The following portions of the patient's history were reviewed and updated as appropriate: allergies, current medications, past family history, past medical history, past social history, past surgical history and problem list       Review of Systems   Constitutional: Negative for chills, fatigue and fever  HENT: Negative for congestion, ear pain and sinus pressure  Eyes: Negative for visual disturbance  Respiratory: Negative for cough, chest tightness and shortness of breath  Cardiovascular: Negative for chest pain and palpitations  Gastrointestinal: Negative for diarrhea, nausea and vomiting  Endocrine: Negative for polyuria  Genitourinary: Negative for dysuria and frequency  Musculoskeletal: Negative for arthralgias and myalgias  Skin: Negative for pallor and rash  Neurological: Negative for dizziness, weakness, light-headedness, numbness and headaches     Psychiatric/Behavioral: Negative for agitation, behavioral problems and sleep disturbance  All other systems reviewed and are negative  Data to review:       Objective:    Vitals:    04/01/22 1328   BP: 112/64   BP Location: Left arm   Patient Position: Sitting   Cuff Size: Large   Temp: (!) 97 4 °F (36 3 °C)   TempSrc: Temporal   Weight: 89 5 kg (197 lb 6 oz)   Height: 6' (1 829 m)        Physical Exam  Constitutional:       General: He is not in acute distress  Appearance: He is well-developed  HENT:      Head: Normocephalic and atraumatic  Right Ear: Tympanic membrane normal       Left Ear: Tympanic membrane normal    Eyes:      Conjunctiva/sclera: Conjunctivae normal    Cardiovascular:      Rate and Rhythm: Normal rate and regular rhythm  Pulmonary:      Effort: Pulmonary effort is normal    Abdominal:      General: Abdomen is flat  Bowel sounds are normal  There is no distension  Palpations: Abdomen is soft  There is no mass  Musculoskeletal:         General: Normal range of motion  Cervical back: Normal range of motion  Skin:     General: Skin is warm  Findings: No rash  Neurological:      Mental Status: He is alert and oriented to person, place, and time     Psychiatric:         Mood and Affect: Mood normal

## 2022-04-05 ENCOUNTER — ANESTHESIA EVENT (OUTPATIENT)
Dept: PERIOP | Facility: HOSPITAL | Age: 57
End: 2022-04-05
Payer: COMMERCIAL

## 2022-04-06 ENCOUNTER — APPOINTMENT (OUTPATIENT)
Dept: RADIOLOGY | Facility: HOSPITAL | Age: 57
End: 2022-04-06
Payer: COMMERCIAL

## 2022-04-06 ENCOUNTER — ANESTHESIA (OUTPATIENT)
Dept: PERIOP | Facility: HOSPITAL | Age: 57
End: 2022-04-06
Payer: COMMERCIAL

## 2022-04-06 ENCOUNTER — HOSPITAL ENCOUNTER (OUTPATIENT)
Facility: HOSPITAL | Age: 57
Setting detail: OUTPATIENT SURGERY
Discharge: HOME | End: 2022-04-06
Attending: PODIATRIST | Admitting: PODIATRIST
Payer: COMMERCIAL

## 2022-04-06 VITALS
SYSTOLIC BLOOD PRESSURE: 122 MMHG | DIASTOLIC BLOOD PRESSURE: 91 MMHG | TEMPERATURE: 96 F | RESPIRATION RATE: 20 BRPM | OXYGEN SATURATION: 100 % | HEART RATE: 3 BPM

## 2022-04-06 DIAGNOSIS — Z98.890 POST-OPERATIVE STATE: Primary | ICD-10-CM

## 2022-04-06 DIAGNOSIS — G89.18 ACUTE POST-OPERATIVE PAIN: ICD-10-CM

## 2022-04-06 PROCEDURE — C1713 ANCHOR/SCREW BN/BN,TIS/BN: HCPCS | Performed by: PODIATRIST

## 2022-04-06 PROCEDURE — C1769 GUIDE WIRE: HCPCS | Performed by: PODIATRIST

## 2022-04-06 PROCEDURE — 73620 X-RAY EXAM OF FOOT: CPT

## 2022-04-06 PROCEDURE — C1776 JOINT DEVICE (IMPLANTABLE): HCPCS | Performed by: PODIATRIST

## 2022-04-06 PROCEDURE — 73630 X-RAY EXAM OF FOOT: CPT

## 2022-04-06 DEVICE — IMPLANTABLE DEVICE
Type: IMPLANTABLE DEVICE | Site: FOOT | Status: FUNCTIONAL
Brand: MDI

## 2022-04-06 RX ORDER — OXYCODONE HYDROCHLORIDE AND ACETAMINOPHEN 5; 325 MG/1; MG/1
1 TABLET ORAL ONCE AS NEEDED
Status: DISCONTINUED | OUTPATIENT
Start: 2022-04-06 | End: 2022-04-06 | Stop reason: HOSPADM

## 2022-04-06 RX ORDER — OXYCODONE HYDROCHLORIDE AND ACETAMINOPHEN 5; 325 MG/1; MG/1
1 TABLET ORAL EVERY 4 HOURS PRN
Qty: 20 TABLET | Refills: 0 | Status: SHIPPED | OUTPATIENT
Start: 2022-04-06 | End: 2022-04-16

## 2022-04-06 RX ORDER — CEFAZOLIN SODIUM 2 G/50ML
SOLUTION INTRAVENOUS AS NEEDED
Status: DISCONTINUED | OUTPATIENT
Start: 2022-04-06 | End: 2022-04-06

## 2022-04-06 RX ORDER — DIPHENHYDRAMINE HYDROCHLORIDE 50 MG/ML
12.5 INJECTION INTRAMUSCULAR; INTRAVENOUS ONCE AS NEEDED
Status: DISCONTINUED | OUTPATIENT
Start: 2022-04-06 | End: 2022-04-06 | Stop reason: HOSPADM

## 2022-04-06 RX ORDER — BUPIVACAINE HYDROCHLORIDE 5 MG/ML
INJECTION, SOLUTION PERINEURAL AS NEEDED
Status: DISCONTINUED | OUTPATIENT
Start: 2022-04-06 | End: 2022-04-06 | Stop reason: HOSPADM

## 2022-04-06 RX ORDER — FENTANYL CITRATE/PF 50 MCG/ML
25 SYRINGE (ML) INJECTION
Status: DISCONTINUED | OUTPATIENT
Start: 2022-04-06 | End: 2022-04-06 | Stop reason: HOSPADM

## 2022-04-06 RX ORDER — FENTANYL CITRATE 50 UG/ML
INJECTION, SOLUTION INTRAMUSCULAR; INTRAVENOUS AS NEEDED
Status: DISCONTINUED | OUTPATIENT
Start: 2022-04-06 | End: 2022-04-06

## 2022-04-06 RX ORDER — CEPHALEXIN 500 MG/1
500 CAPSULE ORAL EVERY 6 HOURS SCHEDULED
Qty: 28 CAPSULE | Refills: 0 | Status: SHIPPED | OUTPATIENT
Start: 2022-04-06 | End: 2022-04-13

## 2022-04-06 RX ORDER — LIDOCAINE HYDROCHLORIDE 20 MG/ML
INJECTION, SOLUTION INFILTRATION; PERINEURAL AS NEEDED
Status: DISCONTINUED | OUTPATIENT
Start: 2022-04-06 | End: 2022-04-06 | Stop reason: HOSPADM

## 2022-04-06 RX ORDER — CHLORHEXIDINE GLUCONATE 0.12 MG/ML
15 RINSE ORAL ONCE
Status: COMPLETED | OUTPATIENT
Start: 2022-04-06 | End: 2022-04-06

## 2022-04-06 RX ORDER — MAGNESIUM HYDROXIDE 1200 MG/15ML
LIQUID ORAL AS NEEDED
Status: DISCONTINUED | OUTPATIENT
Start: 2022-04-06 | End: 2022-04-06 | Stop reason: HOSPADM

## 2022-04-06 RX ORDER — ACETAMINOPHEN 325 MG/1
650 TABLET ORAL ONCE AS NEEDED
Status: DISCONTINUED | OUTPATIENT
Start: 2022-04-06 | End: 2022-04-06 | Stop reason: HOSPADM

## 2022-04-06 RX ORDER — CEFAZOLIN SODIUM 2 G/50ML
2000 SOLUTION INTRAVENOUS ONCE
Status: CANCELLED | OUTPATIENT
Start: 2022-04-06 | End: 2022-04-06

## 2022-04-06 RX ORDER — SODIUM CHLORIDE, SODIUM LACTATE, POTASSIUM CHLORIDE, CALCIUM CHLORIDE 600; 310; 30; 20 MG/100ML; MG/100ML; MG/100ML; MG/100ML
100 INJECTION, SOLUTION INTRAVENOUS CONTINUOUS
Status: DISCONTINUED | OUTPATIENT
Start: 2022-04-06 | End: 2022-04-06 | Stop reason: HOSPADM

## 2022-04-06 RX ORDER — GLYCOPYRROLATE 0.2 MG/ML
INJECTION INTRAMUSCULAR; INTRAVENOUS AS NEEDED
Status: DISCONTINUED | OUTPATIENT
Start: 2022-04-06 | End: 2022-04-06

## 2022-04-06 RX ORDER — MIDAZOLAM HYDROCHLORIDE 2 MG/2ML
INJECTION, SOLUTION INTRAMUSCULAR; INTRAVENOUS AS NEEDED
Status: DISCONTINUED | OUTPATIENT
Start: 2022-04-06 | End: 2022-04-06

## 2022-04-06 RX ORDER — ONDANSETRON 2 MG/ML
4 INJECTION INTRAMUSCULAR; INTRAVENOUS ONCE AS NEEDED
Status: DISCONTINUED | OUTPATIENT
Start: 2022-04-06 | End: 2022-04-06 | Stop reason: HOSPADM

## 2022-04-06 RX ORDER — LIDOCAINE HYDROCHLORIDE 10 MG/ML
0.5 INJECTION, SOLUTION EPIDURAL; INFILTRATION; INTRACAUDAL; PERINEURAL ONCE AS NEEDED
Status: DISCONTINUED | OUTPATIENT
Start: 2022-04-06 | End: 2022-04-06 | Stop reason: HOSPADM

## 2022-04-06 RX ORDER — KETOROLAC TROMETHAMINE 30 MG/ML
INJECTION, SOLUTION INTRAMUSCULAR; INTRAVENOUS AS NEEDED
Status: DISCONTINUED | OUTPATIENT
Start: 2022-04-06 | End: 2022-04-06

## 2022-04-06 RX ORDER — PROPOFOL 10 MG/ML
INJECTION, EMULSION INTRAVENOUS AS NEEDED
Status: DISCONTINUED | OUTPATIENT
Start: 2022-04-06 | End: 2022-04-06

## 2022-04-06 RX ORDER — PROPOFOL 10 MG/ML
INJECTION, EMULSION INTRAVENOUS CONTINUOUS PRN
Status: DISCONTINUED | OUTPATIENT
Start: 2022-04-06 | End: 2022-04-06

## 2022-04-06 RX ORDER — LIDOCAINE HYDROCHLORIDE 10 MG/ML
INJECTION, SOLUTION EPIDURAL; INFILTRATION; INTRACAUDAL; PERINEURAL AS NEEDED
Status: DISCONTINUED | OUTPATIENT
Start: 2022-04-06 | End: 2022-04-06

## 2022-04-06 RX ADMIN — CHLORHEXIDINE GLUCONATE 0.12% ORAL RINSE 15 ML: 1.2 LIQUID ORAL at 06:48

## 2022-04-06 RX ADMIN — KETOROLAC TROMETHAMINE 30 MG: 30 INJECTION, SOLUTION INTRAMUSCULAR at 09:04

## 2022-04-06 RX ADMIN — LIDOCAINE HYDROCHLORIDE 50 MG: 10 INJECTION, SOLUTION EPIDURAL; INFILTRATION; INTRACAUDAL; PERINEURAL at 07:41

## 2022-04-06 RX ADMIN — CEFAZOLIN SODIUM 2000 MG: 2 SOLUTION INTRAVENOUS at 07:37

## 2022-04-06 RX ADMIN — SODIUM CHLORIDE, SODIUM LACTATE, POTASSIUM CHLORIDE, AND CALCIUM CHLORIDE 100 ML/HR: .6; .31; .03; .02 INJECTION, SOLUTION INTRAVENOUS at 09:38

## 2022-04-06 RX ADMIN — FENTANYL CITRATE 50 MCG: 50 INJECTION, SOLUTION INTRAMUSCULAR; INTRAVENOUS at 07:37

## 2022-04-06 RX ADMIN — MIDAZOLAM 2 MG: 1 INJECTION INTRAMUSCULAR; INTRAVENOUS at 07:37

## 2022-04-06 RX ADMIN — GLYCOPYRROLATE 0.2 MG: 0.2 INJECTION, SOLUTION INTRAMUSCULAR; INTRAVENOUS at 07:37

## 2022-04-06 RX ADMIN — PROPOFOL 50 MG: 10 INJECTION, EMULSION INTRAVENOUS at 07:41

## 2022-04-06 RX ADMIN — PROPOFOL 100 MCG/KG/MIN: 10 INJECTION, EMULSION INTRAVENOUS at 07:41

## 2022-04-06 RX ADMIN — SODIUM CHLORIDE, SODIUM LACTATE, POTASSIUM CHLORIDE, AND CALCIUM CHLORIDE 100 ML/HR: .6; .31; .03; .02 INJECTION, SOLUTION INTRAVENOUS at 07:03

## 2022-04-06 RX ADMIN — FENTANYL CITRATE 25 MCG: 50 INJECTION, SOLUTION INTRAMUSCULAR; INTRAVENOUS at 09:35

## 2022-04-06 RX ADMIN — PROPOFOL 30 MG: 10 INJECTION, EMULSION INTRAVENOUS at 07:44

## 2022-04-06 RX ADMIN — FENTANYL CITRATE 50 MCG: 50 INJECTION, SOLUTION INTRAMUSCULAR; INTRAVENOUS at 07:59

## 2022-04-06 RX ADMIN — SODIUM CHLORIDE, SODIUM LACTATE, POTASSIUM CHLORIDE, AND CALCIUM CHLORIDE: .6; .31; .03; .02 INJECTION, SOLUTION INTRAVENOUS at 09:00

## 2022-04-06 NOTE — ANESTHESIA POSTPROCEDURE EVALUATION
Post-Op Assessment Note    CV Status:  Stable  Pain Score: 0    Pain management: adequate     Mental Status:  Alert and awake   Hydration Status:  Euvolemic   PONV Controlled:  Controlled   Airway Patency:  Patent      Post Op Vitals Reviewed: Yes      Staff: CRNA         No complications documented      /56 (04/06/22 0910)    Temp 97 9 °F (36 6 °C) (04/06/22 0910)    Pulse (!) 46 (04/06/22 0910)   Resp 16 (04/06/22 0910)    SpO2 98 % (04/06/22 0910)

## 2022-04-06 NOTE — ANESTHESIA PREPROCEDURE EVALUATION
Procedure:  HALLUX RIGIDUS REPAIR , YOUNGSWICK VS MDI (Right Foot)    Relevant Problems   /RENAL   (+) BPH (benign prostatic hyperplasia)      NEURO/PSYCH   (+) Depression      Other   (+) Celiac disease        Physical Exam    Airway    Mallampati score: II  TM Distance: >3 FB  Neck ROM: full     Dental       Cardiovascular      Pulmonary      Other Findings        Anesthesia Plan  ASA Score- 2     Anesthesia Type- IV sedation with anesthesia with ASA Monitors  Additional Monitors:   Airway Plan:           Plan Factors-Exercise tolerance (METS): >4 METS  Chart reviewed  Patient summary reviewed  Patient is a current smoker  Patient did not smoke on day of surgery  Induction- intravenous  Postoperative Plan- Plan for postoperative opioid use  Informed Consent- Anesthetic plan and risks discussed with patient  I personally reviewed this patient with the CRNA  Discussed and agreed on the Anesthesia Plan with the CRNA  Enrique Preciado

## 2022-04-06 NOTE — DISCHARGE INSTRUCTIONS
Dr Saniya Koch Instructions    1  Take your prescribed medication as directed  2  Upon arrival at home, lie down and elevate your surgical foot on 2 pillows  3  Remain quiet, off your feet as much as possible, for the first 24-48 hours  This is when your feet first swell and may become painful  After 48 hours you may begin limited walking following these restrictions:   Weightbear as tolerated to surgical foot with CAM boot and crutches  4  Drink large quantities of water  Consume no alcohol  Continue a well-balanced diet  5  Report any unusual discomfort or fever to this office  6  A limited amount of discomfort and swelling is to be expected  In some cases the skin may take on a bruised appearance  The surgical solution that was applied to your foot prior to the operation is dark in color and the operation site may appear to be oozing when it actually is not  7  A slight amount of blood is to be expected, and is no cause for alarm  Do not remove the dressings  If there is active bleeding and if the bleeding persists, add additional gauze to the bandage, apply direct pressure, elevate your feet and call this office  8  Do not get the dressings wet  As regular bathing may be inconvenient, sponge baths are recommended  9  When anesthesia wears off and if any discomfort should be present, apply an ice pack directly over the operated area for 15 minute intervals for several hours or until the pain leaves  (USE IN EXCESS OF 15 MINUTES COULD CAUSE FROSTBITE)  Do not use hot water bags or electric pads  A convenient icepack can be made by placing ice cubes in a plastic bag and covering this with a towel  10  If necessary, take a mild laxative before retiring  11  Wear your special open shoes anytime you put weight on your foot, even if it is just to walk to the bathroom and back  It will probably be 2 or 3 weeks before you will be permitted to try regular shoes    12  Having performed the operation, we are interested in a prompt recovery  Please cooperate by following the above instructions  13  Please call to confirm your post-op appointment or call with any other questions  Additional Notes: Patient consent was obtained to proceed with the visit and recommended plan of care after discussion of all risks and benefits, including the risks of COVID-19 exposure. Detail Level: Simple

## 2022-04-06 NOTE — OP NOTE
OPERATIVE REPORT - Podiatry  PATIENT NAME: Mina Macdonald    :  1965  MRN: [id number]  Pt Location:  OR ROOM 12    SURGERY DATE: 2022    Surgeon(s) and Role:     * Indu Perez DPM - Primary     * THANIA Nunes - Assisting    Pre-op Diagnosis:  Hallux rigidus, right foot [M20 21]    Post-Op Diagnosis Codes:     * Hallux rigidus, right foot [M20 21]    Procedure(s) (LRB):  HALLUX RIGIDUS REPAIR , application of MDI implant  (Right)    Specimen(s):  * No specimens in log *    Estimated Blood Loss:   Minimal    Drains:  * No LDAs found *    Anesthesia Type:   IV Sedation with Anesthesia with 10 ml of 2% Lidocaine     Hemostasis:  Surgical dissection  Pneumatic ankle tourniquet placed for 63 minutes    Materials:  Implant Name Type Inv  Item Serial No   Lot No  LRB No  Used Action   IMPLANT DECOMPRESSION 15 3 X 19 6 MM METATARSAL - [id number]  IMPLANT DECOMPRESSION 15 3 X 19 6 MM METATARSAL  [facility] [id number] Right 1 Implanted     Vicryl and Prolene    Operative Findings: The joint was then inspected for any cartilaginous defects, which were over 75%  Complications:   None    Procedure and Technique:     Under mild sedation, the patient was brought into the operating room and placed on the operating room table in the supine position  A pneumatic tourniquet was then placed around the patient's right lower extremity with ample webril padding  A time out was performed to confirm the correct patient, procedure and site with all parties in agreement  The foot was then scrubbed, prepped and draped in the usual aseptic manner  An esmarch bandage was utilized to exsangunate the patients foot and the tourniquet was then inflated  The esmarch bandage was removed  and the foot was placed on the operating room table      A local block was performed consisting of 10 ml of 2% Lidocaine     Attention was then directed to the dorsomedial aspect of the first ray, where a linear longitudinal incision was made  The incision was deepened through the subcutaneous tissues using sharp and blunt dissection  Care was taken to identify and retract all vital neurovascular structures  All bleeders were ligated and cauterized as necessary  At this time, a linear longitudinal capsular and periosteal incision was made at the first metatarsophalangeal joint  The capsule was reflected from the medial and dorsomedial aspect of the first metatarsal head  The joint was then inspected for any cartilaginous defects, which were over 75%  At this time, a sagittal saw was utilized to resect the prominent dorsal eminence on the first metatarsal head  Utilizing a sagittal saw all prominent osteophytes were removed from the dorsal, medial, lateral and plantar surfaces of the joint  In setting of extensive cartilaginous defect, decision was made to proceed with [facility] MDI decompression implant  Following manufacture protocol, MDI decompression implant was placed to 1st metatarsal head without an incident  Under fluoroscopy position was obtained that was confirmed  The wound was then flushed with copious amounts of sterile saline  The periosteal and capsular structures were reapproximated using 3-0 vicryl  Subcutaneous closure was obtained utilizing 4-0 Vicryl in an interrupted suture technique  Skin edges were reapproximated and closure was obtained utilizing interrupted retention sutures utilizing 4-0  Prolene  The foot was then cleansed and dried  A postoperative injection consisting of 10 ml of 0 5% Bupivacaine was performed  The incision site was dressed with Betadine soaked adaptic, 4x4 gauze,   This was then covered with a Chito and an Coban wrap  The tourniquet was deflated and normal hyperemic flush was noted to all digits  The patient tolerated the procedure and anesthesia well and was transported to the PACU with vital signs stable       Dr Samanta Nazario was present during the entire procedure and participated in all key aspects  SIGNATURE: DIANE Owens [facility]  DATE: April 6, 2022  TIME: 9:17 AM      Portions of the record may have been created with voice recognition software  Occasional wrong word or "sound a like" substitutions may have occurred due to the inherent limitations of voice recognition software  Read the chart carefully and recognize, using context, where substitutions have occurred

## 2022-04-06 NOTE — NURSING NOTE
Patient ambulated to bathroom  Voided  Surgical boot applied  No noted drainage  No noeted distress

## 2022-04-06 NOTE — INTERVAL H&P NOTE
H&P reviewed  After examining the patient I find no changes in the patients condition since the H&P had been written      Vitals:    04/06/22 0619   BP: 135/94   Pulse: 60   Resp: 20   Temp: 97 5 °F (36 4 °C)   SpO2: 98%

## 2022-04-06 NOTE — DISCHARGE SUMMARY
Discharge Summary Outpatient Procedure Podiatry -   Francia Lujan 64 y o  male MRN: [id number]  Unit/Bed#: OR POOL Encounter: [id number]    Admission Date: 4/6/2022     Admitting Diagnosis: Hallux rigidus, right foot [M20 21]    Discharge Diagnosis: same    Procedures Performed: HALLUX RIGIDUS REPAIR ,  MDI implant      Complications: none    Condition at Discharge: stable    Discharge instructions/Information to patient and family:   See after visit summary for information provided to patient and family  Provisions for Follow-Up Care/Important appointments:  See after visit summary for information related to follow-up care and any pertinent home health orders  Discharge Medications:  See after visit summary for reconciled discharge medications provided to patient and family

## 2022-08-17 DIAGNOSIS — F32.5 MAJOR DEPRESSIVE DISORDER WITH SINGLE EPISODE, IN FULL REMISSION (HCC): ICD-10-CM

## 2022-08-17 RX ORDER — CITALOPRAM 10 MG/1
TABLET ORAL
Qty: 30 TABLET | Refills: 5 | Status: SHIPPED | OUTPATIENT
Start: 2022-08-17

## 2023-01-30 ENCOUNTER — TELEPHONE (OUTPATIENT)
Dept: FAMILY MEDICINE CLINIC | Facility: CLINIC | Age: 58
End: 2023-01-30

## 2023-01-30 DIAGNOSIS — D70.9 NEUTROPENIA, UNSPECIFIED TYPE (HCC): ICD-10-CM

## 2023-01-30 DIAGNOSIS — F32.5 MAJOR DEPRESSIVE DISORDER WITH SINGLE EPISODE, IN FULL REMISSION (HCC): Primary | ICD-10-CM

## 2023-01-30 DIAGNOSIS — N40.0 BENIGN PROSTATIC HYPERPLASIA, UNSPECIFIED WHETHER LOWER URINARY TRACT SYMPTOMS PRESENT: ICD-10-CM

## 2023-01-30 DIAGNOSIS — R53.83 OTHER FATIGUE: ICD-10-CM

## 2023-01-30 DIAGNOSIS — K90.0 CELIAC DISEASE: ICD-10-CM

## 2023-01-30 NOTE — TELEPHONE ENCOUNTER
Patient called into the office requesting if Ajay Jama can put in some blood work scripts for him to get done before his physical  Patient requested the ones he normally gets every year  Please advise

## 2023-02-07 ENCOUNTER — APPOINTMENT (OUTPATIENT)
Dept: LAB | Facility: CLINIC | Age: 58
End: 2023-02-07

## 2023-02-07 DIAGNOSIS — F32.5 MAJOR DEPRESSIVE DISORDER WITH SINGLE EPISODE, IN FULL REMISSION (HCC): ICD-10-CM

## 2023-02-07 DIAGNOSIS — R53.83 OTHER FATIGUE: ICD-10-CM

## 2023-02-07 DIAGNOSIS — N40.0 BENIGN PROSTATIC HYPERPLASIA, UNSPECIFIED WHETHER LOWER URINARY TRACT SYMPTOMS PRESENT: ICD-10-CM

## 2023-02-07 DIAGNOSIS — K90.0 CELIAC DISEASE: ICD-10-CM

## 2023-02-07 DIAGNOSIS — D70.9 NEUTROPENIA, UNSPECIFIED TYPE (HCC): ICD-10-CM

## 2023-02-07 LAB
ALBUMIN SERPL BCP-MCNC: 3.8 G/DL (ref 3.5–5)
ALP SERPL-CCNC: 52 U/L (ref 46–116)
ALT SERPL W P-5'-P-CCNC: 27 U/L (ref 12–78)
ANION GAP SERPL CALCULATED.3IONS-SCNC: 1 MMOL/L (ref 4–13)
AST SERPL W P-5'-P-CCNC: 19 U/L (ref 5–45)
B BURGDOR IGG+IGM SER-ACNC: <0.2 AI
BASOPHILS # BLD AUTO: 0.04 THOUSANDS/ÂΜL (ref 0–0.1)
BASOPHILS NFR BLD AUTO: 1 % (ref 0–1)
BILIRUB SERPL-MCNC: 0.36 MG/DL (ref 0.2–1)
BUN SERPL-MCNC: 18 MG/DL (ref 5–25)
CALCIUM SERPL-MCNC: 9.2 MG/DL (ref 8.3–10.1)
CHLORIDE SERPL-SCNC: 111 MMOL/L (ref 96–108)
CHOLEST SERPL-MCNC: 173 MG/DL
CO2 SERPL-SCNC: 28 MMOL/L (ref 21–32)
CREAT SERPL-MCNC: 1.13 MG/DL (ref 0.6–1.3)
EOSINOPHIL # BLD AUTO: 0.17 THOUSAND/ÂΜL (ref 0–0.61)
EOSINOPHIL NFR BLD AUTO: 4 % (ref 0–6)
ERYTHROCYTE [DISTWIDTH] IN BLOOD BY AUTOMATED COUNT: 12.1 % (ref 11.6–15.1)
GFR SERPL CREATININE-BSD FRML MDRD: 71 ML/MIN/1.73SQ M
GLUCOSE P FAST SERPL-MCNC: 106 MG/DL (ref 65–99)
HCT VFR BLD AUTO: 45.9 % (ref 36.5–49.3)
HDLC SERPL-MCNC: 54 MG/DL
HGB BLD-MCNC: 14.9 G/DL (ref 12–17)
IMM GRANULOCYTES # BLD AUTO: 0.02 THOUSAND/UL (ref 0–0.2)
IMM GRANULOCYTES NFR BLD AUTO: 0 % (ref 0–2)
LDLC SERPL CALC-MCNC: 100 MG/DL (ref 0–100)
LYMPHOCYTES # BLD AUTO: 1.31 THOUSANDS/ÂΜL (ref 0.6–4.47)
LYMPHOCYTES NFR BLD AUTO: 29 % (ref 14–44)
MCH RBC QN AUTO: 30.3 PG (ref 26.8–34.3)
MCHC RBC AUTO-ENTMCNC: 32.5 G/DL (ref 31.4–37.4)
MCV RBC AUTO: 94 FL (ref 82–98)
MONOCYTES # BLD AUTO: 0.41 THOUSAND/ÂΜL (ref 0.17–1.22)
MONOCYTES NFR BLD AUTO: 9 % (ref 4–12)
NEUTROPHILS # BLD AUTO: 2.52 THOUSANDS/ÂΜL (ref 1.85–7.62)
NEUTS SEG NFR BLD AUTO: 57 % (ref 43–75)
NRBC BLD AUTO-RTO: 0 /100 WBCS
PLATELET # BLD AUTO: 197 THOUSANDS/UL (ref 149–390)
PMV BLD AUTO: 10.2 FL (ref 8.9–12.7)
POTASSIUM SERPL-SCNC: 4.3 MMOL/L (ref 3.5–5.3)
PROT SERPL-MCNC: 7 G/DL (ref 6.4–8.4)
PSA SERPL-MCNC: 2.1 NG/ML (ref 0–4)
RBC # BLD AUTO: 4.91 MILLION/UL (ref 3.88–5.62)
SODIUM SERPL-SCNC: 140 MMOL/L (ref 135–147)
TRIGL SERPL-MCNC: 96 MG/DL
TSH SERPL DL<=0.05 MIU/L-ACNC: 1.85 UIU/ML (ref 0.45–4.5)
WBC # BLD AUTO: 4.47 THOUSAND/UL (ref 4.31–10.16)

## 2023-02-08 ENCOUNTER — OFFICE VISIT (OUTPATIENT)
Dept: FAMILY MEDICINE CLINIC | Facility: CLINIC | Age: 58
End: 2023-02-08

## 2023-02-08 VITALS
TEMPERATURE: 97.6 F | SYSTOLIC BLOOD PRESSURE: 104 MMHG | BODY MASS INDEX: 26.43 KG/M2 | WEIGHT: 195.13 LBS | DIASTOLIC BLOOD PRESSURE: 62 MMHG | HEIGHT: 72 IN | HEART RATE: 62 BPM | OXYGEN SATURATION: 97 %

## 2023-02-08 DIAGNOSIS — Z00.00 HEALTHCARE MAINTENANCE: Primary | ICD-10-CM

## 2023-02-08 DIAGNOSIS — F32.5 MAJOR DEPRESSIVE DISORDER WITH SINGLE EPISODE, IN FULL REMISSION (HCC): ICD-10-CM

## 2023-02-08 DIAGNOSIS — N40.0 BENIGN PROSTATIC HYPERPLASIA, UNSPECIFIED WHETHER LOWER URINARY TRACT SYMPTOMS PRESENT: ICD-10-CM

## 2023-02-08 DIAGNOSIS — N52.9 ERECTILE DYSFUNCTION, UNSPECIFIED ERECTILE DYSFUNCTION TYPE: ICD-10-CM

## 2023-02-08 LAB
TESTOST FREE SERPL-MCNC: 15.9 PG/ML (ref 7.2–24)
TESTOST SERPL-MCNC: 493 NG/DL (ref 264–916)

## 2023-02-08 RX ORDER — CITALOPRAM 10 MG/1
10 TABLET ORAL DAILY
Qty: 30 TABLET | Refills: 5 | Status: SHIPPED | OUTPATIENT
Start: 2023-02-08

## 2023-02-08 RX ORDER — TADALAFIL 20 MG/1
20 TABLET ORAL DAILY PRN
Qty: 30 TABLET | Refills: 5 | Status: SHIPPED | OUTPATIENT
Start: 2023-02-08

## 2023-02-08 NOTE — PROGRESS NOTES
Name: Avni Chung      : 1965      MRN: [id number]  Encounter Provider: María Elena Petty PA-C  Encounter Date: 2023   Encounter department: [facility] PRIMARY [facility]ARE    Assessment & Plan      Patient Instructions   Assessment/plan:  1  Healthcare maintenance-healthy-appearing 59-year-old gentleman  Encouraged continued healthy diet and exercise plan  Labs were reviewed with the patient and presently stable  Follow-up screening colonoscopy recommended  Patient last had in 2018 with 5-year recall suggested  PSA was reviewed with patient and stable  2   Major depressive disorder in remission-patient continues low-dose of citalopram   He is currently on 10 mg every other day and is considering weaning to a lower dose  I would suggest that he could use 5 mg daily, then transition to 5 every other day to see if he is still needing it  3   Benign prostatic hyperplasia-stable on Flomax as necessary for symptoms  4   Erectile dysfunction-stable with Cialis 20 mg as necessary  Coupon from [employer] website printed for patient  1  Healthcare maintenance    2  Major depressive disorder with single episode, [facility]  -     citalopram (CeleXA) 10 mg tablet; Take 1 tablet (10 mg total) by mouth daily    3  Benign prostatic hyperplasia, unspecified whether lower urinary tract symptoms present    4  Erectile dysfunction, unspecified erectile dysfunction type  -     tadalafil (CIALIS) 20 MG tablet; Take 1 tablet (20 mg total) by mouth daily as needed for erectile dysfunction         Subjective      HPI: This is a 59-year-old gentleman that presents to the office for health maintenance physical and follow-up of chronic health conditions  He has been feeling well without any acute complaints  He did go through toe surgery back in 2022 for hallux rigidus  He felt that the recovery process took a month or 2 but was not too bad    He does have a history of mild depressive disorder and continues on citalopram   He is currently taking 10 mg every other day and feels that it is helpful  Previously when he tried stopping the medication he feels that there was some stressor that may have exacerbated his symptoms and he had to restart  He also has history of BPH and continues to use Flomax as needed for symptoms  He does have some history of erectile dysfunction and continue Cialis 20 mg when necessary  He is getting this through coupons on [email or web address] website  He is due for screening colonoscopy, last having this in January 2018 and being suggested for 5-year recall with Dr Nataly Fernandez  Review of Systems   Constitutional: Negative for chills, fatigue and fever  HENT: Negative for congestion, ear pain and sinus pressure  Eyes: Negative for visual disturbance  Respiratory: Negative for cough, chest tightness and shortness of breath  Cardiovascular: Negative for chest pain and palpitations  Gastrointestinal: Negative for diarrhea, nausea and vomiting  Endocrine: Negative for polyuria  Genitourinary: Negative for dysuria and frequency  Musculoskeletal: Negative for arthralgias and myalgias  Skin: Negative for pallor and rash  Neurological: Negative for dizziness, weakness, light-headedness, numbness and headaches  Psychiatric/Behavioral: Negative for agitation, behavioral problems and sleep disturbance  All other systems reviewed and are negative        Current Outpatient Medications on File Prior to Visit   Medication Sig   • diclofenac (VOLTAREN) 75 mg EC tablet Take 75 mg by mouth 2 (two) times a day as needed   • Magnesium Gluconate (MAGNESIUM 27 PO) Take by mouth   • S-Adenosylmethionine (AMERICO-E PO) Take by mouth   • Silodosin (RAPAFLO PO) Take by mouth   • Tamsulosin HCl (FLOMAX PO) Take by mouth Pt states he takes based on urine flow   • [DISCONTINUED] citalopram (CeleXA) 10 mg tablet TAKE 1 TABLET BY MOUTH EVERY DAY (Patient taking differently: Pt takes qod) • [DISCONTINUED] tadalafil (CIALIS) 20 MG tablet Take 1 tablet (20 mg total) by mouth daily as needed for erectile dysfunction   • b complex vitamins capsule Take 1 capsule by mouth daily (Patient not taking: Reported on 2/8/2023)       Objective     /62 (BP Location: Left arm, Patient Position: Sitting, Cuff Size: Large)   Pulse 62   Temp 97 6 °F (36 4 °C) (Temporal)   Ht 6' (1 829 m)   Wt 88 5 kg (195 lb 2 oz)   SpO2 97%   BMI 26 46 kg/m²     Physical Exam  Vitals and nursing note reviewed  Constitutional:       General: He is not in acute distress  Appearance: He is well-developed  HENT:      Head: Normocephalic and atraumatic  Right Ear: External ear normal       Left Ear: External ear normal       Nose: Nose normal       Mouth/Throat:      Pharynx: No oropharyngeal exudate  Eyes:      Conjunctiva/sclera: Conjunctivae normal       Pupils: Pupils are equal, round, and reactive to light  Neck:      Thyroid: No thyromegaly  Trachea: No tracheal deviation  Cardiovascular:      Rate and Rhythm: Normal rate and regular rhythm  Heart sounds: Normal heart sounds  No murmur heard  No friction rub  Pulmonary:      Effort: Pulmonary effort is normal  No respiratory distress  Breath sounds: Normal breath sounds  No wheezing or rales  Abdominal:      General: Bowel sounds are normal  There is no distension  Palpations: Abdomen is soft  Tenderness: There is no abdominal tenderness  There is no guarding or rebound  Musculoskeletal:         General: No tenderness  Normal range of motion  Cervical back: Normal range of motion and neck supple  Lymphadenopathy:      Cervical: No cervical adenopathy  Skin:     General: Skin is warm and dry  Findings: No erythema or rash  Neurological:      Mental Status: He is alert and oriented to person, place, and time  Cranial Nerves: No cranial nerve deficit        Coordination: Coordination normal  Psychiatric:         Behavior: Behavior normal          Thought Content:  Thought content normal        Armando Lantigua PA-C

## 2023-02-08 NOTE — PROGRESS NOTES
ADULT ANNUAL [facility] [facility] PRIMARY CARE    NAME: Eugenio Goldman  AGE: 62 y o  SEX: male  : 1965     DATE: 2023     Assessment and Plan:     Problem List Items Addressed This Visit        Genitourinary    BPH (benign prostatic hyperplasia)       Other    Depression   Other Visit Diagnoses     Healthcare maintenance    -  Primary    Erectile dysfunction, unspecified erectile dysfunction type              Immunizations and preventive care screenings were discussed with patient today  Appropriate education was printed on patient's after visit summary  {Prostate cancer screening - consider in males between age of 43-69 depending on age, race, and risk factors  There is difference in the guidelines in regards to the optimal age for screening  USPSTF states to consider periodic screening in males between the age of 48 to 71  This text is informational and does not need to be selected but if you wish, you can insert standard documentation in your progress note by using F2 (Optional):11780}    Counseling:  · {Annual Physical; Counselin}         No follow-ups on file  Chief Complaint:     Chief Complaint   Patient presents with   • Physical Exam     Pt states an overall yearly check  History of Present Illness:     Adult Annual Physical   Patient here for a comprehensive physical exam  The patient reports {problems:41326}  Diet and Physical Activity  · Diet/Nutrition: {annual physical; diet:13925611}  · Exercise: {annual physical; exercise:29631157}  Depression Screening  PHQ-2/9 Depression Screening         General Health  · Sleep: {annual physical; sleep:2102}  · Hearing: {annual physical; hearin}  · Vision: {annual physical; vision:}  · Dental: {annual physical; dental:85665697}          Health  · Symptoms include: {annual physical; urinary symptoms:91445::"none"}     Review of Systems:     Review of Systems   Past Medical History:     Past Medical History:   Diagnosis Date   • Pneumonia    • Tinnitus       Past Surgical History:     Past Surgical History:   Procedure Laterality Date   • BUNIONECTOMY Right 4/6/2022    Procedure: HALLUX RIGIDUS REPAIR , FRANKIE REGALADO;  Surgeon: Chelsi Leong DPM;  Location: [facility] MAIN OR;  Service: Podiatry   • COLONOSCOPY     • HEMORRHOID SURGERY     • VASECTOMY     • WISDOM TOOTH EXTRACTION        Family History:     Family History   Problem Relation Age of Onset   • Diabetes Mother    • Heart disease Mother    • Prostate cancer Father       Social History:     Social History     Socioeconomic History   • Marital status:      Spouse name: None   • Number of children: None   • Years of education: None   • Highest education level: None   Occupational History   • None   Tobacco Use   • Smoking status: Former   • Smokeless tobacco: Former   Substance and Sexual Activity   • Alcohol use: Yes     Comment: moderate   • Drug use: Not Currently   • Sexual activity: None   Other Topics Concern   • None   Social History Narrative   • None     Social Determinants of Health     Financial Resource Strain: Not on file   Food Insecurity: Not on file   Transportation Needs: Not on file   Physical Activity: Not on file   Stress: Not on file   Social Connections: Not on file   Intimate Partner Violence: Not on file   Housing Stability: Not on file      Current Medications:     Current Outpatient Medications   Medication Sig Dispense Refill   • citalopram (CeleXA) 10 mg tablet TAKE 1 TABLET BY MOUTH EVERY DAY (Patient taking differently: Pt takes qod) 30 tablet 5   • diclofenac (VOLTAREN) 75 mg EC tablet Take 75 mg by mouth 2 (two) times a day as needed     • Magnesium Gluconate (MAGNESIUM 27 PO) Take by mouth     • S-Adenosylmethionine (AMERICO-E PO) Take by mouth     • Silodosin (RAPAFLO PO) Take by mouth     • tadalafil (CIALIS) 20 MG tablet Take 1 tablet (20 mg total) by mouth daily as needed for erectile dysfunction 30 tablet 5   • Tamsulosin HCl (FLOMAX PO) Take by mouth Pt states he takes based on urine flow     • b complex vitamins capsule Take 1 capsule by mouth daily (Patient not taking: Reported on 2/8/2023)       No current facility-administered medications for this visit  Allergies:      Allergies   Allergen Reactions   • Glyceryl Monolaurate Other (See Comments)   • Latex Other (See Comments)     contact      Physical Exam:     /62 (BP Location: Left arm, Patient Position: Sitting, Cuff Size: Large)   Pulse 62   Temp 97 6 °F (36 4 °C) (Temporal)   Ht 6' (1 829 m)   Wt 88 5 kg (195 lb 2 oz)   SpO2 97%   BMI 26 46 kg/m²     Physical Exam     Indio Valentin PA-C  [facility] PRIMARY CARE

## 2023-02-08 NOTE — PATIENT INSTRUCTIONS
Assessment/plan:  1  Healthcare maintenance-healthy-appearing 49-year-old gentleman  Encouraged continued healthy diet and exercise plan  Labs were reviewed with the patient and presently stable  Follow-up screening colonoscopy recommended  Patient last had in January 2018 with 5-year recall suggested  PSA was reviewed with patient and stable  2   Major depressive disorder in remission-patient continues low-dose of citalopram   He is currently on 10 mg every other day and is considering weaning to a lower dose  I would suggest that he could use 5 mg daily, then transition to 5 every other day to see if he is still needing it  3   Benign prostatic hyperplasia-stable on Flomax as necessary for symptoms  4   Erectile dysfunction-stable with Cialis 20 mg as necessary  Coupon from [employer] website printed for patient

## 2024-02-08 ENCOUNTER — TELEPHONE (OUTPATIENT)
Age: 59
End: 2024-02-08

## 2024-02-08 DIAGNOSIS — K90.0 CELIAC DISEASE: ICD-10-CM

## 2024-02-08 DIAGNOSIS — F32.5 MAJOR DEPRESSIVE DISORDER WITH SINGLE EPISODE, IN FULL REMISSION (HCC): ICD-10-CM

## 2024-02-08 DIAGNOSIS — Z12.5 SCREENING FOR MALIGNANT NEOPLASM OF PROSTATE: Primary | ICD-10-CM

## 2024-02-08 NOTE — TELEPHONE ENCOUNTER
Routine blood test have been ordered unfortunately there is no blood test for Parkinson's disease.

## 2024-02-08 NOTE — TELEPHONE ENCOUNTER
Next appt on 02/15/2024 for AWV    Patient requested scripts for lab work and also wanted to know if there is a blood test for parkinson markers. Patient explained that his mom had parkinson. Patient can be reached at [phone]. Please advise.

## 2024-02-09 ENCOUNTER — RA CDI HCC (OUTPATIENT)
Dept: OTHER | Facility: HOSPITAL | Age: 59
End: 2024-02-09

## 2024-02-09 NOTE — PROGRESS NOTES
HCC coding opportunities          Chart Reviewed number of suggestions sent to Provider: 1  [facility]     [facility]        Commercial Insurance: [employer]

## 2024-02-12 ENCOUNTER — TELEPHONE (OUTPATIENT)
Age: 59
End: 2024-02-12

## 2024-02-12 NOTE — TELEPHONE ENCOUNTER
Patient called to verify that his lab work scripts are in the system and ready for him to proceed to the lab.  Confirmed same.

## 2024-02-15 ENCOUNTER — APPOINTMENT (OUTPATIENT)
Dept: LAB | Facility: CLINIC | Age: 59
End: 2024-02-15
Payer: COMMERCIAL

## 2024-02-15 LAB
ALBUMIN SERPL BCP-MCNC: 4.4 G/DL (ref 3.5–5)
ALP SERPL-CCNC: 49 U/L (ref 34–104)
ALT SERPL W P-5'-P-CCNC: 21 U/L (ref 7–52)
ANION GAP SERPL CALCULATED.3IONS-SCNC: 9 MMOL/L
AST SERPL W P-5'-P-CCNC: 22 U/L (ref 13–39)
BASOPHILS # BLD AUTO: 0.03 THOUSANDS/ÂΜL (ref 0–0.1)
BASOPHILS NFR BLD AUTO: 1 % (ref 0–1)
BILIRUB SERPL-MCNC: 0.69 MG/DL (ref 0.2–1)
BUN SERPL-MCNC: 17 MG/DL (ref 5–25)
CALCIUM SERPL-MCNC: 8.8 MG/DL (ref 8.4–10.2)
CHLORIDE SERPL-SCNC: 105 MMOL/L (ref 96–108)
CHOLEST SERPL-MCNC: 173 MG/DL
CO2 SERPL-SCNC: 28 MMOL/L (ref 21–32)
CREAT SERPL-MCNC: 1.15 MG/DL (ref 0.6–1.3)
EOSINOPHIL # BLD AUTO: 0.19 THOUSAND/ÂΜL (ref 0–0.61)
EOSINOPHIL NFR BLD AUTO: 5 % (ref 0–6)
ERYTHROCYTE [DISTWIDTH] IN BLOOD BY AUTOMATED COUNT: 12.2 % (ref 11.6–15.1)
GFR SERPL CREATININE-BSD FRML MDRD: 69 ML/MIN/1.73SQ M
GLUCOSE P FAST SERPL-MCNC: 106 MG/DL (ref 65–99)
HCT VFR BLD AUTO: 46 % (ref 36.5–49.3)
HDLC SERPL-MCNC: 59 MG/DL
HGB BLD-MCNC: 15.1 G/DL (ref 12–17)
IMM GRANULOCYTES # BLD AUTO: 0.02 THOUSAND/UL (ref 0–0.2)
IMM GRANULOCYTES NFR BLD AUTO: 1 % (ref 0–2)
LDLC SERPL CALC-MCNC: 98 MG/DL (ref 0–100)
LYMPHOCYTES # BLD AUTO: 1.49 THOUSANDS/ÂΜL (ref 0.6–4.47)
LYMPHOCYTES NFR BLD AUTO: 36 % (ref 14–44)
MCH RBC QN AUTO: 30.5 PG (ref 26.8–34.3)
MCHC RBC AUTO-ENTMCNC: 32.8 G/DL (ref 31.4–37.4)
MCV RBC AUTO: 93 FL (ref 82–98)
MONOCYTES # BLD AUTO: 0.42 THOUSAND/ÂΜL (ref 0.17–1.22)
MONOCYTES NFR BLD AUTO: 10 % (ref 4–12)
NEUTROPHILS # BLD AUTO: 1.95 THOUSANDS/ÂΜL (ref 1.85–7.62)
NEUTS SEG NFR BLD AUTO: 47 % (ref 43–75)
NRBC BLD AUTO-RTO: 0 /100 WBCS
PLATELET # BLD AUTO: 185 THOUSANDS/UL (ref 149–390)
PMV BLD AUTO: 10.3 FL (ref 8.9–12.7)
POTASSIUM SERPL-SCNC: 4.4 MMOL/L (ref 3.5–5.3)
PROT SERPL-MCNC: 6.8 G/DL (ref 6.4–8.4)
PSA SERPL-MCNC: 3.12 NG/ML (ref 0–4)
RBC # BLD AUTO: 4.95 MILLION/UL (ref 3.88–5.62)
SODIUM SERPL-SCNC: 142 MMOL/L (ref 135–147)
TRIGL SERPL-MCNC: 82 MG/DL
TSH SERPL DL<=0.05 MIU/L-ACNC: 2.21 UIU/ML (ref 0.45–4.5)
WBC # BLD AUTO: 4.1 THOUSAND/UL (ref 4.31–10.16)

## 2024-02-15 PROCEDURE — 84443 ASSAY THYROID STIM HORMONE: CPT

## 2024-02-15 PROCEDURE — 36415 COLL VENOUS BLD VENIPUNCTURE: CPT

## 2024-02-15 PROCEDURE — 85025 COMPLETE CBC W/AUTO DIFF WBC: CPT

## 2024-02-15 PROCEDURE — 80053 COMPREHEN METABOLIC PANEL: CPT

## 2024-02-15 PROCEDURE — G0103 PSA SCREENING: HCPCS

## 2024-02-15 PROCEDURE — 80061 LIPID PANEL: CPT

## 2024-02-21 ENCOUNTER — OFFICE VISIT (OUTPATIENT)
Dept: FAMILY MEDICINE CLINIC | Facility: CLINIC | Age: 59
End: 2024-02-21
Payer: COMMERCIAL

## 2024-02-21 VITALS
WEIGHT: 189.6 LBS | DIASTOLIC BLOOD PRESSURE: 72 MMHG | SYSTOLIC BLOOD PRESSURE: 128 MMHG | OXYGEN SATURATION: 98 % | BODY MASS INDEX: 25.68 KG/M2 | HEIGHT: 72 IN | HEART RATE: 60 BPM

## 2024-02-21 DIAGNOSIS — F32.5 MAJOR DEPRESSIVE DISORDER WITH SINGLE EPISODE, IN FULL REMISSION (HCC): ICD-10-CM

## 2024-02-21 DIAGNOSIS — N52.9 ERECTILE DYSFUNCTION, UNSPECIFIED ERECTILE DYSFUNCTION TYPE: ICD-10-CM

## 2024-02-21 DIAGNOSIS — Z00.00 HEALTH CARE MAINTENANCE: Primary | ICD-10-CM

## 2024-02-21 DIAGNOSIS — H93.12 TINNITUS AURIUM, LEFT: ICD-10-CM

## 2024-02-21 PROCEDURE — 99396 PREV VISIT EST AGE 40-64: CPT | Performed by: PHYSICIAN ASSISTANT

## 2024-02-21 PROCEDURE — 99214 OFFICE O/P EST MOD 30 MIN: CPT | Performed by: PHYSICIAN ASSISTANT

## 2024-02-21 RX ORDER — TADALAFIL 20 MG/1
20 TABLET ORAL DAILY PRN
Qty: 30 TABLET | Refills: 5 | Status: SHIPPED | OUTPATIENT
Start: 2024-02-21

## 2024-02-21 RX ORDER — CITALOPRAM HYDROBROMIDE 10 MG/1
10 TABLET ORAL DAILY
Qty: 30 TABLET | Refills: 5 | Status: SHIPPED | OUTPATIENT
Start: 2024-02-21

## 2024-02-21 NOTE — PROGRESS NOTES
Name: Luis Brothers      : 1965      MRN: [id number]  Encounter Provider: Sebastian Greco PA-C  Encounter Date: 2024   Encounter department: [facility] PRIMARY CARE    Assessment & Plan     Patient Instructions   Assessment/plan:  1.  Healthcare maintenance-colon cancer screening is up-to-date until . Patient just had prostate cancer screening with PSA which was within normal limits.  Labs reviewed in detail with patient.  2.  Tinnitus-this has been persistent for about 1 to 2 years but seems to be progressively worsening or at least after COVID infection around Thanksgiving of last year.  Will order MRI of the brain and referral to ear nose and throat specialist to review.  Patient is interested in finding any possible treatment options as it has become very bothersome to him.  He does have some baseline hearing loss in the left ear and should have reevaluation.  3.  Erectile dysfunction-stable with Cialis as necessary.  4.  Mild major depression-stable with citalopram 10 mg daily.  5.  BPH-stable with Flomax as needed.    1. Health care maintenance    2. Tinnitus aurium, left  -     MRI brain wo contrast; Future; Expected date: 2024  -     Ambulatory Referral to Otolaryngology; Future    3. Major depressive disorder with single episode, in full remission (HCC)  -     citalopram (CeleXA) 10 mg tablet; Take 1 tablet (10 mg total) by mouth daily    4. Erectile dysfunction, unspecified erectile dysfunction type  -     tadalafil (CIALIS) 20 MG tablet; Take 1 tablet (20 mg total) by mouth daily as needed for erectile dysfunction           Subjective      HPI: This is a 58-year-old gentleman that presents to the office for annual physical.  He has been feeling well for the most part but he does complain of tinnitus that has been affecting primarily the left ear but somewhat in the right ear.  This has been going on to some extent for a year or 2 but seems worse in the past several  months.  He did have COVID infection around [facility] of last year and it may have been worse since then.  He has never had any imaging of the brain for the tinnitus.  He states he does have some natural hearing loss in the left ear and has hearing aid which he does not use often at all.  He does occasionally get some symptoms of BPH but he uses Flomax when necessary and that seems to help.  Sometimes he just needs to take it for a week and it seems better.  He also uses Cialis as necessary for erectile dysfunction.  He has had recent blood work which she is here to review.      Review of Systems   Constitutional:  Negative for chills, fatigue and fever.   HENT:  Negative for congestion, ear pain and sinus pressure.    Eyes:  Negative for visual disturbance.   Respiratory:  Negative for cough, chest tightness and shortness of breath.    Cardiovascular:  Negative for chest pain and palpitations.   Gastrointestinal:  Negative for diarrhea, nausea and vomiting.   Endocrine: Negative for polyuria.   Genitourinary:  Negative for dysuria and frequency.   Musculoskeletal:  Negative for arthralgias and myalgias.   Skin:  Negative for pallor and rash.   Neurological:  Negative for dizziness, weakness, light-headedness, numbness and headaches.   Psychiatric/Behavioral:  Negative for agitation, behavioral problems and sleep disturbance.    All other systems reviewed and are negative.      Current Outpatient Medications on File Prior to Visit   Medication Sig    Magnesium Gluconate (MAGNESIUM 27 PO) Take by mouth    S-Adenosylmethionine (AMERICO-E PO) Take by mouth    Tamsulosin HCl (FLOMAX PO) Take by mouth Pt states he takes based on urine flow    [DISCONTINUED] citalopram (CeleXA) 10 mg tablet Take 1 tablet (10 mg total) by mouth daily    [DISCONTINUED] tadalafil (CIALIS) 20 MG tablet Take 1 tablet (20 mg total) by mouth daily as needed for erectile dysfunction    [DISCONTINUED] b complex vitamins capsule Take 1 capsule by  mouth daily (Patient not taking: Reported on 2/8/2023)    [DISCONTINUED] diclofenac (VOLTAREN) 75 mg EC tablet Take 75 mg by mouth 2 (two) times a day as needed (Patient not taking: Reported on 2/21/2024)    [DISCONTINUED] Silodosin (RAPAFLO PO) Take by mouth (Patient not taking: Reported on 2/21/2024)       Objective     /72 (BP Location: Left arm, Patient Position: Sitting, Cuff Size: Standard)   Pulse 60   Ht 6' (1.829 m)   Wt 86 kg (189 lb 9.6 oz)   SpO2 98%   BMI 25.71 kg/m²     Physical Exam  Vitals and nursing note reviewed.   Constitutional:       General: He is not in acute distress.     Appearance: He is well-developed.   HENT:      Head: Normocephalic and atraumatic.      Right Ear: External ear normal.      Left Ear: External ear normal.      Nose: Nose normal.      Mouth/Throat:      Pharynx: No oropharyngeal exudate.   Eyes:      Conjunctiva/sclera: Conjunctivae normal.      Pupils: Pupils are equal, round, and reactive to light.   Neck:      Thyroid: No thyromegaly.      Trachea: No tracheal deviation.   Cardiovascular:      Rate and Rhythm: Normal rate and regular rhythm.      Heart sounds: Normal heart sounds. No murmur heard.     No friction rub.   Pulmonary:      Effort: Pulmonary effort is normal. No respiratory distress.      Breath sounds: Normal breath sounds. No wheezing or rales.   Abdominal:      General: Bowel sounds are normal. There is no distension.      Palpations: Abdomen is soft.      Tenderness: There is no abdominal tenderness. There is no guarding or rebound.   Musculoskeletal:         General: No tenderness. Normal range of motion.      Cervical back: Normal range of motion and neck supple.   Lymphadenopathy:      Cervical: No cervical adenopathy.   Skin:     General: Skin is warm and dry.      Findings: No erythema or rash.   Neurological:      Mental Status: He is alert and oriented to person, place, and time.      Cranial Nerves: No cranial nerve deficit.       Coordination: Coordination normal.   Psychiatric:         Behavior: Behavior normal.         Thought Content: Thought content normal.       Sebastian Greco PA-C

## 2024-02-21 NOTE — PATIENT INSTRUCTIONS
Assessment/plan:  1.  Healthcare maintenance-colon cancer screening is up-to-date until 2028. Patient just had prostate cancer screening with PSA which was within normal limits.  Labs reviewed in detail with patient.  2.  Tinnitus-this has been persistent for about 1 to 2 years but seems to be progressively worsening or at least after COVID infection around Thanksgiving of last year.  Will order MRI of the brain and referral to ear nose and throat specialist to review.  Patient is interested in finding any possible treatment options as it has become very bothersome to him.  He does have some baseline hearing loss in the left ear and should have reevaluation.  3.  Erectile dysfunction-stable with Cialis as necessary.  4.  Mild major depression-stable with citalopram 10 mg daily.  5.  BPH-stable with Flomax as needed.

## 2024-04-29 ENCOUNTER — OFFICE VISIT (OUTPATIENT)
Dept: FAMILY MEDICINE CLINIC | Facility: CLINIC | Age: 59
End: 2024-04-29
Payer: COMMERCIAL

## 2024-04-29 VITALS
WEIGHT: 187 LBS | SYSTOLIC BLOOD PRESSURE: 116 MMHG | HEART RATE: 86 BPM | OXYGEN SATURATION: 97 % | BODY MASS INDEX: 25.33 KG/M2 | DIASTOLIC BLOOD PRESSURE: 80 MMHG | HEIGHT: 72 IN

## 2024-04-29 DIAGNOSIS — K35.80 ACUTE APPENDICITIS, UNSPECIFIED ACUTE APPENDICITIS TYPE: Primary | ICD-10-CM

## 2024-04-29 DIAGNOSIS — F32.5 MAJOR DEPRESSIVE DISORDER WITH SINGLE EPISODE, IN FULL REMISSION (HCC): ICD-10-CM

## 2024-04-29 DIAGNOSIS — N40.0 BENIGN PROSTATIC HYPERPLASIA, UNSPECIFIED WHETHER LOWER URINARY TRACT SYMPTOMS PRESENT: ICD-10-CM

## 2024-04-29 PROCEDURE — 99214 OFFICE O/P EST MOD 30 MIN: CPT | Performed by: PHYSICIAN ASSISTANT

## 2024-04-29 PROCEDURE — 3725F SCREEN DEPRESSION PERFORMED: CPT | Performed by: PHYSICIAN ASSISTANT

## 2024-04-29 RX ORDER — OXYCODONE HYDROCHLORIDE 5 MG/1
TABLET ORAL
COMMUNITY
Start: 2024-04-23 | End: 2024-04-29

## 2024-04-29 RX ORDER — AMOXICILLIN AND CLAVULANATE POTASSIUM 875; 125 MG/1; MG/1
1 TABLET, FILM COATED ORAL 2 TIMES DAILY
COMMUNITY
Start: 2024-04-23 | End: 2024-04-30

## 2024-04-29 NOTE — PATIENT INSTRUCTIONS
Assessment/plan:  1.  Appendicitis-patient is 1 week status post laparoscopic appendectomy, doing well, honoring lifting restrictions, has returned to work a part-time capacity and would like to return full-time.  He does have follow-up with surgeon in 1 week.  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  Release with lifting restrictions provided.  2.  Depression-presently stable with citalopram 10 mg daily, no medication changes.  3.  BPH-stable on tamsulosin and Cialis as necessary.   Statement Selected

## 2024-04-29 NOTE — LETTER
April 29, 2024     Patient: Luis Brothers  YOB: 1965  Date of Visit: 4/29/2024      To Whom it May Concern:    Luis Brothers is under my professional care. Luis was seen in my office on 4/29/2024. Luis may return to work 4/29/2024.  He is to continue a 2 week lifting restriction of no more than 20lbs.  If you have any questions or concerns, please don't hesitate to call.         Sincerely,          Sebastian Greco PA-C        CC: No Recipients

## 2024-04-29 NOTE — PROGRESS NOTES
Name: Luis Brothers      : 1965      MRN: [id number]  Encounter Provider: Sebastian Greco PA-C  Encounter Date: 2024   Encounter department: [facility] PRIMARY CARE    Assessment & Plan     Patient Instructions   Assessment/plan:  1.  Appendicitis-patient is 1 week status post laparoscopic appendectomy, doing well, honoring lifting restrictions, has returned to work a part-time capacity and would like to return full-time.  He does have follow-up with surgeon in 1 week.  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  Release with lifting restrictions provided.  2.  Depression-presently stable with citalopram 10 mg daily, no medication changes.  3.  BPH-stable on tamsulosin and Cialis as necessary.    1. Acute appendicitis, unspecified acute appendicitis type    2. Major depressive disorder with single episode, in full remission (HCC)    3. Benign prostatic hyperplasia, unspecified whether lower urinary tract symptoms present      Depression Screening and Follow-up Plan: Patient was screened for depression during today's encounter. They screened negative with a PHQ-9 score of 0.        Subjective      HPI: This is a 58-year-old gentleman that presents to the office after having appendectomy 1 week ago.  This was performed laparoscopically at [facility].  Patient has been feeling well since the surgery.  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  He has been doing mostly office work, initially starting with 2 hours a day but today feels like he could go full-time without difficulty.  He has been honoring the lifting restrictions of no more than 10 pounds for the first week and then 20 pounds for the second week.  He has been having regular bowel movements and has not had any difficulty with stomach distress.  He does not have follow-up with surgeon for 1 week but was hoping to get a letter to go back to full-time work this week.      Review  of Systems   Constitutional:  Negative for chills, fatigue and fever.   HENT:  Negative for congestion, ear pain and sinus pressure.    Eyes:  Negative for visual disturbance.   Respiratory:  Negative for cough, chest tightness and shortness of breath.    Cardiovascular:  Negative for chest pain and palpitations.   Gastrointestinal:  Negative for diarrhea, nausea and vomiting.   Endocrine: Negative for polyuria.   Genitourinary:  Negative for dysuria and frequency.   Musculoskeletal:  Negative for arthralgias and myalgias.   Skin:  Negative for pallor and rash.   Neurological:  Negative for dizziness, weakness, light-headedness, numbness and headaches.   Psychiatric/Behavioral:  Negative for agitation, behavioral problems and sleep disturbance.    All other systems reviewed and are negative.      Current Outpatient Medications on File Prior to Visit   Medication Sig   • amoxicillin-clavulanate (AUGMENTIN) 875-125 mg per tablet Take 1 tablet by mouth 2 (two) times a day   • citalopram (CeleXA) 10 mg tablet Take 1 tablet (10 mg total) by mouth daily   • Magnesium Gluconate (MAGNESIUM 27 PO) Take by mouth   • S-Adenosylmethionine (AMERICO-E PO) Take by mouth   • tadalafil (CIALIS) 20 MG tablet Take 1 tablet (20 mg total) by mouth daily as needed for erectile dysfunction   • Tamsulosin HCl (FLOMAX PO) Take by mouth Pt states he takes based on urine flow   • [DISCONTINUED] oxyCODONE (ROXICODONE) 5 immediate release tablet  (Patient not taking: Reported on 4/29/2024)       Objective     /80 (BP Location: Left arm, Patient Position: Sitting, Cuff Size: Standard)   Pulse 86   Ht 6' (1.829 m)   Wt 84.8 kg (187 lb)   SpO2 97%   BMI 25.36 kg/m²     Physical Exam  Vitals and nursing note reviewed.   Constitutional:       General: He is not in acute distress.     Appearance: He is well-developed.   HENT:      Head: Normocephalic and atraumatic.      Right Ear: External ear normal.      Left Ear: External ear normal.       Nose: Nose normal.      Mouth/Throat:      Pharynx: No oropharyngeal exudate.   Eyes:      Conjunctiva/sclera: Conjunctivae normal.      Pupils: Pupils are equal, round, and reactive to light.   Neck:      Thyroid: No thyromegaly.      Trachea: No tracheal deviation.   Cardiovascular:      Rate and Rhythm: Normal rate and regular rhythm.      Heart sounds: Normal heart sounds. No murmur heard.     No friction rub.   Pulmonary:      Effort: Pulmonary effort is normal. No respiratory distress.      Breath sounds: Normal breath sounds. No wheezing or rales.   Abdominal:      General: Bowel sounds are normal. There is no distension.      Palpations: Abdomen is soft.      Tenderness: There is no abdominal tenderness. There is no guarding or rebound.   Musculoskeletal:         General: No tenderness. Normal range of motion.      Cervical back: Normal range of motion and neck supple.   Lymphadenopathy:      Cervical: No cervical adenopathy.   Skin:     General: Skin is warm and dry.      Findings: No erythema or rash.      Comments: Port sites of the abdomen are clean, dry, intact without purulence or discharge.  The left lower quadrant port site there is a small hematoma which is approximately 3 cm x 5 cm.  This is nontender and not indurated.   Neurological:      Mental Status: He is alert and oriented to person, place, and time.      Cranial Nerves: No cranial nerve deficit.      Coordination: Coordination normal.   Psychiatric:         Behavior: Behavior normal.         Thought Content: Thought content normal.       Sebastian Greco PA-C

## 2024-06-17 ENCOUNTER — OFFICE VISIT (OUTPATIENT)
Dept: FAMILY MEDICINE CLINIC | Facility: CLINIC | Age: 59
End: 2024-06-17
Payer: COMMERCIAL

## 2024-06-17 ENCOUNTER — APPOINTMENT (OUTPATIENT)
Dept: LAB | Facility: CLINIC | Age: 59
End: 2024-06-17
Payer: COMMERCIAL

## 2024-06-17 VITALS
HEART RATE: 76 BPM | DIASTOLIC BLOOD PRESSURE: 76 MMHG | RESPIRATION RATE: 18 BRPM | TEMPERATURE: 97.5 F | SYSTOLIC BLOOD PRESSURE: 128 MMHG | BODY MASS INDEX: 24.11 KG/M2 | WEIGHT: 178 LBS | OXYGEN SATURATION: 96 % | HEIGHT: 72 IN

## 2024-06-17 DIAGNOSIS — R53.83 OTHER FATIGUE: ICD-10-CM

## 2024-06-17 DIAGNOSIS — R63.1 EXCESSIVE THIRST: ICD-10-CM

## 2024-06-17 DIAGNOSIS — Z90.49 HISTORY OF LAPAROSCOPIC APPENDECTOMY: Primary | ICD-10-CM

## 2024-06-17 DIAGNOSIS — D72.829 LEUKOCYTOSIS, UNSPECIFIED TYPE: ICD-10-CM

## 2024-06-17 DIAGNOSIS — A04.71 RECURRENT CLOSTRIDIUM DIFFICILE DIARRHEA: ICD-10-CM

## 2024-06-17 DIAGNOSIS — R19.7 DIARRHEA, UNSPECIFIED TYPE: ICD-10-CM

## 2024-06-17 DIAGNOSIS — A04.72 C. DIFFICILE COLITIS: ICD-10-CM

## 2024-06-17 DIAGNOSIS — Z90.49 HISTORY OF LAPAROSCOPIC APPENDECTOMY: ICD-10-CM

## 2024-06-17 LAB
ALBUMIN SERPL BCP-MCNC: 4.3 G/DL (ref 3.5–5)
ALP SERPL-CCNC: 53 U/L (ref 34–104)
ALT SERPL W P-5'-P-CCNC: 13 U/L (ref 7–52)
ANION GAP SERPL CALCULATED.3IONS-SCNC: 8 MMOL/L (ref 4–13)
AST SERPL W P-5'-P-CCNC: 16 U/L (ref 13–39)
BASOPHILS # BLD AUTO: 0.07 THOUSANDS/ÂΜL (ref 0–0.1)
BASOPHILS NFR BLD AUTO: 1 % (ref 0–1)
BILIRUB SERPL-MCNC: 0.91 MG/DL (ref 0.2–1)
BILIRUB UR QL STRIP: NEGATIVE
BUN SERPL-MCNC: 16 MG/DL (ref 5–25)
CALCIUM SERPL-MCNC: 8.8 MG/DL (ref 8.4–10.2)
CHLORIDE SERPL-SCNC: 106 MMOL/L (ref 96–108)
CLARITY UR: CLEAR
CO2 SERPL-SCNC: 26 MMOL/L (ref 21–32)
COLOR UR: NORMAL
CREAT SERPL-MCNC: 0.95 MG/DL (ref 0.6–1.3)
EOSINOPHIL # BLD AUTO: 0.13 THOUSAND/ÂΜL (ref 0–0.61)
EOSINOPHIL NFR BLD AUTO: 1 % (ref 0–6)
ERYTHROCYTE [DISTWIDTH] IN BLOOD BY AUTOMATED COUNT: 12.7 % (ref 11.6–15.1)
GFR SERPL CREATININE-BSD FRML MDRD: 87 ML/MIN/1.73SQ M
GLUCOSE SERPL-MCNC: 96 MG/DL (ref 65–140)
GLUCOSE UR STRIP-MCNC: NEGATIVE MG/DL
HCT VFR BLD AUTO: 44.9 % (ref 36.5–49.3)
HGB BLD-MCNC: 15 G/DL (ref 12–17)
HGB UR QL STRIP.AUTO: NEGATIVE
IMM GRANULOCYTES # BLD AUTO: 0.07 THOUSAND/UL (ref 0–0.2)
IMM GRANULOCYTES NFR BLD AUTO: 1 % (ref 0–2)
KETONES UR STRIP-MCNC: NEGATIVE MG/DL
LEUKOCYTE ESTERASE UR QL STRIP: NEGATIVE
LYMPHOCYTES # BLD AUTO: 1.08 THOUSANDS/ÂΜL (ref 0.6–4.47)
LYMPHOCYTES NFR BLD AUTO: 8 % (ref 14–44)
MCH RBC QN AUTO: 31.1 PG (ref 26.8–34.3)
MCHC RBC AUTO-ENTMCNC: 33.4 G/DL (ref 31.4–37.4)
MCV RBC AUTO: 93 FL (ref 82–98)
MONOCYTES # BLD AUTO: 1.21 THOUSAND/ÂΜL (ref 0.17–1.22)
MONOCYTES NFR BLD AUTO: 9 % (ref 4–12)
NEUTROPHILS # BLD AUTO: 11.4 THOUSANDS/ÂΜL (ref 1.85–7.62)
NEUTS SEG NFR BLD AUTO: 80 % (ref 43–75)
NITRITE UR QL STRIP: NEGATIVE
NRBC BLD AUTO-RTO: 0 /100 WBCS
PH UR STRIP.AUTO: 5.5 [PH]
PLATELET # BLD AUTO: 191 THOUSANDS/UL (ref 149–390)
PMV BLD AUTO: 10.5 FL (ref 8.9–12.7)
POTASSIUM SERPL-SCNC: 3.8 MMOL/L (ref 3.5–5.3)
PROT SERPL-MCNC: 6.9 G/DL (ref 6.4–8.4)
PROT UR STRIP-MCNC: NEGATIVE MG/DL
RBC # BLD AUTO: 4.83 MILLION/UL (ref 3.88–5.62)
SODIUM SERPL-SCNC: 140 MMOL/L (ref 135–147)
SP GR UR STRIP.AUTO: 1.01 (ref 1–1.03)
UROBILINOGEN UR STRIP-ACNC: <2 MG/DL
WBC # BLD AUTO: 13.96 THOUSAND/UL (ref 4.31–10.16)

## 2024-06-17 PROCEDURE — 36415 COLL VENOUS BLD VENIPUNCTURE: CPT

## 2024-06-17 PROCEDURE — 81003 URINALYSIS AUTO W/O SCOPE: CPT

## 2024-06-17 PROCEDURE — 80053 COMPREHEN METABOLIC PANEL: CPT

## 2024-06-17 PROCEDURE — 85025 COMPLETE CBC W/AUTO DIFF WBC: CPT

## 2024-06-17 PROCEDURE — 99214 OFFICE O/P EST MOD 30 MIN: CPT | Performed by: FAMILY MEDICINE

## 2024-06-17 RX ORDER — COLESEVELAM 180 1/1
TABLET ORAL
Qty: 21 TABLET | Refills: 0 | Status: SHIPPED | OUTPATIENT
Start: 2024-06-17 | End: 2024-06-24

## 2024-06-17 NOTE — PATIENT INSTRUCTIONS
Complete blood work and stool culture today  Follow with infectious disease  Use WelChol tablets 3 times daily as needed for diarrhea  Remain well-hydrated  Return after infectious disease consultation if needed

## 2024-06-17 NOTE — Clinical Note
Please notify patient.  Infectious disease stated that they need to see gastroenterology first I did place an order for gastroenterology evaluation

## 2024-06-17 NOTE — PROGRESS NOTES
Ambulatory Visit  Name: Luis Brothers      : 1965      MRN: [id number]  Encounter Provider: Rl Allen DO  Encounter Date: 2024   Encounter department: [facility] CARE    1.  Status post lap appendectomy, 2024 at [facility] #2.  C. difficile colitis and has 2 rounds of vancomycin from the ER check C. difficile today  3.  Leukocytosis, repeat CBC this was over 22 in the ER 2024  4.  Fatigue  5.  Excessive thirst  Blood work is ordered  6.  Diarrhea, recurrent question C. difficile  Welchol was ordered as this will help symptoms and not make C. difficile worse  C. difficile was ordered today  Refer to infectious disease per patient ER said if this happened a third time he would need to see the specialty  7.  Return after infectious disease evaluation if needed    Addendum  Infectious disease will not except this referral they have stated patient needs to see gastroenterology, gastroenterology consultation was placed        Assessment & Plan   1. History of laparoscopic appendectomy  -     CBC and differential; Future; Expected date: 2024  -     Comprehensive metabolic panel; Future; Expected date: 2024  -     UA (URINE) with reflex to Scope; Future; Expected date: 2024  -     Clostridium difficile toxin by PCR with EIA; Future; Expected date: 2024  2. C. difficile colitis  -     CBC and differential; Future; Expected date: 2024  -     Comprehensive metabolic panel; Future; Expected date: 2024  -     UA (URINE) with reflex to Scope; Future; Expected date: 2024  -     Clostridium difficile toxin by PCR with EIA; Future; Expected date: 2024  -     Ambulatory Referral to Infectious Disease; Future  3. Leukocytosis, unspecified type  -     CBC and differential; Future; Expected date: 2024  -     Comprehensive metabolic panel; Future; Expected date: 2024  -     UA (URINE) with reflex to Scope; Future;  Expected date: 06/17/2024  -     Clostridium difficile toxin by PCR with EIA; Future; Expected date: 06/17/2024  -     Ambulatory Referral to Infectious Disease; Future  4. Other fatigue  -     CBC and differential; Future; Expected date: 06/17/2024  -     Comprehensive metabolic panel; Future; Expected date: 06/17/2024  -     UA (URINE) with reflex to Scope; Future; Expected date: 06/17/2024  -     Clostridium difficile toxin by PCR with EIA; Future; Expected date: 06/17/2024  5. Excessive thirst  -     CBC and differential; Future; Expected date: 06/17/2024  -     Comprehensive metabolic panel; Future; Expected date: 06/17/2024  -     UA (URINE) with reflex to Scope; Future; Expected date: 06/17/2024  -     Clostridium difficile toxin by PCR with EIA; Future; Expected date: 06/17/2024  6. Recurrent Clostridium difficile diarrhea  -     CBC and differential; Future; Expected date: 06/17/2024  -     Comprehensive metabolic panel; Future; Expected date: 06/17/2024  -     UA (URINE) with reflex to Scope; Future; Expected date: 06/17/2024  -     Clostridium difficile toxin by PCR with EIA; Future; Expected date: 06/17/2024  -     Ambulatory Referral to Infectious Disease; Future  7. Diarrhea, unspecified type  -     colesevelam (WELCHOL) 625 mg tablet; Take 1 tablet 3 times daily as needed for diarrhea  -     Ambulatory Referral to Infectious Disease; Future       History of Present Illness     Patient had a laparoscopic appendectomy 4/22/2024 at [facility] with Dr. Rl Infante.  His abdominal pain has resolved however he has had 2 bouts of intense diarrhea positive for C. difficile last 1 was 6/1/2024 seen at [facility] ER placed on vancomycin.  He is to have vancomycin patient was finished with this early last week Sunday diarrhea has restarted.  Of note patient was seen in ER 6/1/2024 and his WBC count was 22.  Patient was told in the ER if he had a third bout of C. difficile he would need to see  infectious disease.        Review of Systems   Constitutional:  Positive for fatigue. Negative for chills and fever.        Positive fatigue negative fever chills   HENT: Negative.     Eyes: Negative.    Respiratory: Negative.     Cardiovascular: Negative.    Gastrointestinal:         HPI   Endocrine: Negative.    Genitourinary: Negative.    Musculoskeletal: Negative.    Skin: Negative.    Allergic/Immunologic: Negative.    Neurological: Negative.    Hematological: Negative.    Psychiatric/Behavioral: Negative.         Objective     /76 (BP Location: Right arm, Patient Position: Sitting, Cuff Size: Large)   Pulse 76   Temp 97.5 °F (36.4 °C) (Tympanic)   Resp 18   Ht 6' (1.829 m)   Wt 80.7 kg (178 lb)   SpO2 96%   BMI 24.14 kg/m²     Physical Exam  Vitals and nursing note reviewed.   Constitutional:       General: He is not in acute distress.     Appearance: Normal appearance. He is not ill-appearing, toxic-appearing or diaphoretic.   HENT:      Head: Normocephalic and atraumatic.      Mouth/Throat:      Mouth: Mucous membranes are moist.   Cardiovascular:      Rate and Rhythm: Normal rate and regular rhythm.      Heart sounds: Normal heart sounds.   Pulmonary:      Effort: Pulmonary effort is normal.      Breath sounds: Normal breath sounds.   Abdominal:      General: Bowel sounds are normal. There is no distension.      Palpations: Abdomen is soft. There is no mass.      Tenderness: There is no abdominal tenderness. There is no right CVA tenderness, left CVA tenderness, guarding or rebound.      Hernia: No hernia is present.   Musculoskeletal:      Cervical back: No rigidity.      Right lower leg: No edema.      Left lower leg: No edema.   Skin:     General: Skin is warm and dry.   Neurological:      General: No focal deficit present.      Mental Status: He is alert.   Psychiatric:         Mood and Affect: Mood normal.       Administrative Statements

## 2024-06-18 ENCOUNTER — APPOINTMENT (OUTPATIENT)
Dept: LAB | Facility: CLINIC | Age: 59
End: 2024-06-18
Payer: COMMERCIAL

## 2024-06-18 DIAGNOSIS — D72.829 LEUKOCYTOSIS, UNSPECIFIED TYPE: ICD-10-CM

## 2024-06-18 DIAGNOSIS — D72.829 LEUKOCYTOSIS, UNSPECIFIED TYPE: Primary | ICD-10-CM

## 2024-06-18 DIAGNOSIS — R63.1 EXCESSIVE THIRST: ICD-10-CM

## 2024-06-18 DIAGNOSIS — A04.72 C. DIFFICILE COLITIS: ICD-10-CM

## 2024-06-18 DIAGNOSIS — A04.71 RECURRENT CLOSTRIDIUM DIFFICILE DIARRHEA: ICD-10-CM

## 2024-06-18 DIAGNOSIS — R53.83 OTHER FATIGUE: ICD-10-CM

## 2024-06-18 DIAGNOSIS — Z90.49 HISTORY OF LAPAROSCOPIC APPENDECTOMY: ICD-10-CM

## 2024-06-18 PROCEDURE — 87493 C DIFF AMPLIFIED PROBE: CPT

## 2024-06-19 DIAGNOSIS — A04.72 C. DIFFICILE DIARRHEA: Primary | ICD-10-CM

## 2024-06-19 LAB
C DIFF TOX A+B STL QL IA: POSITIVE
C DIFF TOX GENS STL QL NAA+PROBE: POSITIVE

## 2024-06-19 RX ORDER — METRONIDAZOLE 500 MG/1
500 TABLET ORAL EVERY 8 HOURS SCHEDULED
Qty: 21 TABLET | Refills: 0 | Status: SHIPPED | OUTPATIENT
Start: 2024-06-19 | End: 2024-06-26

## 2024-12-21 ENCOUNTER — VBI (OUTPATIENT)
Dept: ADMINISTRATIVE | Facility: OTHER | Age: 59
End: 2024-12-21

## 2024-12-21 NOTE — TELEPHONE ENCOUNTER
12/21/24 3:33 PM     Chart reviewed for CRC: Colonoscopy was/were submitted to the patient's insurance.     Zack Rebolledo MA   [facility]

## 2025-03-13 ENCOUNTER — TELEPHONE (OUTPATIENT)
Age: 60
End: 2025-03-13

## 2025-03-13 DIAGNOSIS — R53.83 OTHER FATIGUE: Primary | ICD-10-CM

## 2025-03-13 DIAGNOSIS — F32.5 MAJOR DEPRESSIVE DISORDER WITH SINGLE EPISODE, IN FULL REMISSION (HCC): ICD-10-CM

## 2025-03-13 DIAGNOSIS — Z12.5 SCREENING FOR PROSTATE CANCER: ICD-10-CM

## 2025-03-13 DIAGNOSIS — N40.0 BENIGN PROSTATIC HYPERPLASIA, UNSPECIFIED WHETHER LOWER URINARY TRACT SYMPTOMS PRESENT: ICD-10-CM

## 2025-03-13 NOTE — TELEPHONE ENCOUNTER
Patient would like to know if he can have orders to get his labs done prior to his appointment on the 03/24/2025. He would like to get the labs done he normally would as well as one for Lyme.

## 2025-03-17 ENCOUNTER — RA CDI HCC (OUTPATIENT)
Dept: OTHER | Facility: HOSPITAL | Age: 60
End: 2025-03-17

## 2025-03-19 ENCOUNTER — TRANSCRIBE ORDERS (OUTPATIENT)
Dept: LAB | Facility: CLINIC | Age: 60
End: 2025-03-19

## 2025-03-19 ENCOUNTER — APPOINTMENT (OUTPATIENT)
Dept: LAB | Facility: CLINIC | Age: 60
End: 2025-03-19
Payer: COMMERCIAL

## 2025-03-19 ENCOUNTER — RESULTS FOLLOW-UP (OUTPATIENT)
Dept: FAMILY MEDICINE CLINIC | Facility: CLINIC | Age: 60
End: 2025-03-19

## 2025-03-19 DIAGNOSIS — D72.829 LEUKOCYTOSIS, UNSPECIFIED TYPE: ICD-10-CM

## 2025-03-19 DIAGNOSIS — R53.83 OTHER FATIGUE: ICD-10-CM

## 2025-03-19 LAB
ALBUMIN SERPL BCG-MCNC: 4.3 G/DL (ref 3.5–5)
ALP SERPL-CCNC: 51 U/L (ref 34–104)
ALT SERPL W P-5'-P-CCNC: 26 U/L (ref 7–52)
ANION GAP SERPL CALCULATED.3IONS-SCNC: 7 MMOL/L (ref 4–13)
AST SERPL W P-5'-P-CCNC: 23 U/L (ref 13–39)
BASOPHILS # BLD AUTO: 0.04 THOUSANDS/ÂΜL (ref 0–0.1)
BASOPHILS NFR BLD AUTO: 1 % (ref 0–1)
BILIRUB SERPL-MCNC: 0.57 MG/DL (ref 0.2–1)
BUN SERPL-MCNC: 18 MG/DL (ref 5–25)
CALCIUM SERPL-MCNC: 9 MG/DL (ref 8.4–10.2)
CHLORIDE SERPL-SCNC: 106 MMOL/L (ref 96–108)
CHOLEST SERPL-MCNC: 167 MG/DL (ref ?–200)
CO2 SERPL-SCNC: 29 MMOL/L (ref 21–32)
CREAT SERPL-MCNC: 1.08 MG/DL (ref 0.6–1.3)
EOSINOPHIL # BLD AUTO: 0.19 THOUSAND/ÂΜL (ref 0–0.61)
EOSINOPHIL NFR BLD AUTO: 3 % (ref 0–6)
ERYTHROCYTE [DISTWIDTH] IN BLOOD BY AUTOMATED COUNT: 11.9 % (ref 11.6–15.1)
GFR SERPL CREATININE-BSD FRML MDRD: 74 ML/MIN/1.73SQ M
GLUCOSE P FAST SERPL-MCNC: 104 MG/DL (ref 65–99)
HCT VFR BLD AUTO: 45.5 % (ref 36.5–49.3)
HDLC SERPL-MCNC: 57 MG/DL
HGB BLD-MCNC: 15.1 G/DL (ref 12–17)
IMM GRANULOCYTES # BLD AUTO: 0.02 THOUSAND/UL (ref 0–0.2)
IMM GRANULOCYTES NFR BLD AUTO: 0 % (ref 0–2)
LDLC SERPL CALC-MCNC: 98 MG/DL (ref 0–100)
LYMPHOCYTES # BLD AUTO: 1.47 THOUSANDS/ÂΜL (ref 0.6–4.47)
LYMPHOCYTES NFR BLD AUTO: 21 % (ref 14–44)
MCH RBC QN AUTO: 30.9 PG (ref 26.8–34.3)
MCHC RBC AUTO-ENTMCNC: 33.2 G/DL (ref 31.4–37.4)
MCV RBC AUTO: 93 FL (ref 82–98)
MONOCYTES # BLD AUTO: 0.49 THOUSAND/ÂΜL (ref 0.17–1.22)
MONOCYTES NFR BLD AUTO: 7 % (ref 4–12)
NEUTROPHILS # BLD AUTO: 4.75 THOUSANDS/ÂΜL (ref 1.85–7.62)
NEUTS SEG NFR BLD AUTO: 68 % (ref 43–75)
NRBC BLD AUTO-RTO: 0 /100 WBCS
PLATELET # BLD AUTO: 181 THOUSANDS/UL (ref 149–390)
PMV BLD AUTO: 10.1 FL (ref 8.9–12.7)
POTASSIUM SERPL-SCNC: 4.2 MMOL/L (ref 3.5–5.3)
PROT SERPL-MCNC: 6.8 G/DL (ref 6.4–8.4)
PSA SERPL-MCNC: 2.49 NG/ML (ref 0–4)
RBC # BLD AUTO: 4.89 MILLION/UL (ref 3.88–5.62)
SODIUM SERPL-SCNC: 142 MMOL/L (ref 135–147)
TESTOST SERPL-MSCNC: 412 NG/DL (ref 175–781)
TRIGL SERPL-MCNC: 61 MG/DL (ref ?–150)
TSH SERPL DL<=0.05 MIU/L-ACNC: 2.63 UIU/ML (ref 0.45–4.5)
WBC # BLD AUTO: 6.96 THOUSAND/UL (ref 4.31–10.16)

## 2025-03-19 PROCEDURE — 84403 ASSAY OF TOTAL TESTOSTERONE: CPT

## 2025-03-19 PROCEDURE — 86618 LYME DISEASE ANTIBODY: CPT

## 2025-03-19 PROCEDURE — 85025 COMPLETE CBC W/AUTO DIFF WBC: CPT

## 2025-03-19 PROCEDURE — G0103 PSA SCREENING: HCPCS

## 2025-03-19 PROCEDURE — 84443 ASSAY THYROID STIM HORMONE: CPT

## 2025-03-19 PROCEDURE — 36415 COLL VENOUS BLD VENIPUNCTURE: CPT

## 2025-03-19 PROCEDURE — 80053 COMPREHEN METABOLIC PANEL: CPT

## 2025-03-19 PROCEDURE — 80061 LIPID PANEL: CPT

## 2025-03-20 LAB — B BURGDOR IGG+IGM SER QL IA: NEGATIVE

## 2025-03-24 ENCOUNTER — OFFICE VISIT (OUTPATIENT)
Dept: FAMILY MEDICINE CLINIC | Facility: CLINIC | Age: 60
End: 2025-03-24
Payer: COMMERCIAL

## 2025-03-24 VITALS
HEIGHT: 72 IN | BODY MASS INDEX: 24.65 KG/M2 | DIASTOLIC BLOOD PRESSURE: 90 MMHG | HEART RATE: 54 BPM | WEIGHT: 182 LBS | SYSTOLIC BLOOD PRESSURE: 126 MMHG | OXYGEN SATURATION: 96 %

## 2025-03-24 DIAGNOSIS — H93.12 TINNITUS AURIUM, LEFT: ICD-10-CM

## 2025-03-24 DIAGNOSIS — F32.5 MAJOR DEPRESSIVE DISORDER WITH SINGLE EPISODE, IN FULL REMISSION (HCC): ICD-10-CM

## 2025-03-24 DIAGNOSIS — N40.0 BENIGN PROSTATIC HYPERPLASIA, UNSPECIFIED WHETHER LOWER URINARY TRACT SYMPTOMS PRESENT: ICD-10-CM

## 2025-03-24 DIAGNOSIS — Z00.00 ANNUAL PHYSICAL EXAM: Primary | ICD-10-CM

## 2025-03-24 PROCEDURE — 99396 PREV VISIT EST AGE 40-64: CPT | Performed by: PHYSICIAN ASSISTANT

## 2025-03-24 NOTE — PROGRESS NOTES
Adult Annual Physical  Name: Luis Brothers      : 1965      MRN: [id number]  Encounter Provider: Sebastian Greco PA-C  Encounter Date: 3/24/2025   [facility]    Assessment & Plan  Annual physical exam  Healthy appearing 59-year-old gentleman, appears younger than stated age.  Continues to be physically active.  Recent labs reviewed with patient and stable.  Prostate cancer screening normal.  Colon cancer screening up-to-date until .  We discussed shingles vaccine.  He will consider getting this in the next year.       Major depressive disorder with single episode, in full remission (HCC)  Stable on citalopram 10 mg daily, no medication changes.       Benign prostatic hyperplasia, unspecified whether lower urinary tract symptoms present  Stable on tamsulosin therapy.       Tinnitus aurium, left  This is persistent, some days worse than others.  Left side is worse than the right side.  Patient has had some history of hearing loss on the side.  Consider further evaluation with ENT specialist.  Patient is content to monitor at this time.       Preventive Screenings:  - Diabetes Screening: screening up-to-date  - Cholesterol Screening: screening up-to-date   - Hepatitis C screening: screening up-to-date   - HIV screening: screening up-to-date   - Colon cancer screening: screening up-to-date   - Lung cancer screening: screening not indicated   - Prostate cancer screening: screening up-to-date     Immunizations:  - Immunizations due: Influenza, Tdap and Zoster (Shingrix)      Depression Screening and Follow-up Plan: Patient was screened for depression during today's encounter. They screened negative with a PHQ-9 score of 0.          History of Present Illness     Adult Annual Physical:  Patient presents for annual physical. HPI: This is a 59-year-old gentleman that presents to the office for follow-up of chronic health conditions and annual physical exam.  He has  been feeling well without any acute complaints recently.  He does have a history of depression which has been stable with citalopram.  He is taking 10 mg daily, and sometimes taking half a tablet of these to reduce dosage.  Overall he feels he has been stable.  He also continues with some tinnitus primarily on the left side which is the side that he has had previous hearing loss.  He does have a little bit on the right side.  This does seem to come in varying intensities but is typically around the same pitch.  He has considered seeing an ear nose and throat specialist but it has not bothered him enough yet to get to that point.  He also has history of BPH which is stable with tamsulosin as necessary..     Diet and Physical Activity:  - Diet/Nutrition: no special diet.  - Exercise: moderate cardiovascular exercise.    Depression Screening:    - PHQ-9 Score: 0    General Health:  - Sleep: sleeps well.  - Hearing: normal hearing right ear.  - Vision: no vision problems.  - Dental: regular dental visits.    /GYN Health:    - History of STDs: no     Health:  - History of STDs: no.     Advanced Care Planning:  - Has an advanced directive?: no    - Has a durable medical POA?: no    - ACP document given to patient?: no      Review of Systems   Constitutional:  Negative for chills, fatigue and fever.   HENT:  Negative for congestion, ear pain and sinus pressure.    Eyes:  Negative for visual disturbance.   Respiratory:  Negative for cough, chest tightness and shortness of breath.    Cardiovascular:  Negative for chest pain and palpitations.   Gastrointestinal:  Negative for diarrhea, nausea and vomiting.   Endocrine: Negative for polyuria.   Genitourinary:  Negative for dysuria and frequency.   Musculoskeletal:  Negative for arthralgias and myalgias.   Skin:  Negative for pallor and rash.   Neurological:  Negative for dizziness, weakness, light-headedness, numbness and headaches.   Psychiatric/Behavioral:  Negative for  "agitation, behavioral problems and sleep disturbance.    All other systems reviewed and are negative.        Objective   /90 (BP Location: Left arm, Patient Position: Sitting, Cuff Size: Standard)   Pulse (!) 54   Ht 5' 11.5\" (1.816 m)   Wt 82.6 kg (182 lb)   SpO2 96%   BMI 25.03 kg/m²     Physical Exam  Constitutional:       General: He is not in acute distress.     Appearance: He is well-developed.   HENT:      Head: Normocephalic and atraumatic.      Right Ear: Tympanic membrane normal.      Left Ear: Tympanic membrane normal.   Eyes:      Conjunctiva/sclera: Conjunctivae normal.   Cardiovascular:      Rate and Rhythm: Normal rate and regular rhythm.   Pulmonary:      Effort: Pulmonary effort is normal.   Abdominal:      General: Abdomen is flat. Bowel sounds are normal. There is no distension.      Palpations: Abdomen is soft. There is no mass.   Musculoskeletal:         General: Normal range of motion.      Cervical back: Normal range of motion.   Skin:     General: Skin is warm.      Findings: No rash.   Neurological:      Mental Status: He is alert and oriented to person, place, and time.   Psychiatric:         Mood and Affect: Mood normal.         "

## 2025-03-24 NOTE — ASSESSMENT & PLAN NOTE
This is persistent, some days worse than others.  Left side is worse than the right side.  Patient has had some history of hearing loss on the side.  Consider further evaluation with ENT specialist.  Patient is content to monitor at this time.

## 2025-03-24 NOTE — PATIENT INSTRUCTIONS
"Patient Education     Routine physical for adults   The Basics   Written by the doctors and editors at [facility]   What is a physical? -- A physical is a routine visit, or \"check-up,\" with your doctor. You might also hear it called a \"wellness visit\" or \"preventive visit.\"  During each visit, the doctor will:   Ask about your physical and mental health   Ask about your habits, behaviors, and lifestyle   Do an exam   Give you vaccines if needed   Talk to you about any medicines you take   Give advice about your health   Answer your questions  Getting regular check-ups is an important part of taking care of your health. It can help your doctor find and treat any problems you have. But it's also important for preventing health problems.  A routine physical is different from a \"sick visit.\" A sick visit is when you see a doctor because of a health concern or problem. Since physicals are scheduled ahead of time, you can think about what you want to ask the doctor.  How often should I get a physical? -- It depends on your age and health. In general, for people age 21 years and older:   If you are younger than 50 years, you might be able to get a physical every 3 years.   If you are 50 years or older, your doctor might recommend a physical every year.  If you have an ongoing health condition, like diabetes or high blood pressure, your doctor will probably want to see you more often.  What happens during a physical? -- In general, each visit will include:   Physical exam - The doctor or nurse will check your height, weight, heart rate, and blood pressure. They will also look at your eyes and ears. They will ask about how you are feeling and whether you have any symptoms that bother you.   Medicines - It's a good idea to bring a list of all the medicines you take to each doctor visit. Your doctor will talk to you about your medicines and answer any questions. Tell them if you are having any side effects that bother you. You " "should also tell them if you are having trouble paying for any of your medicines.   Habits and behaviors - This includes:   Your diet   Your exercise habits   Whether you smoke, drink alcohol, or use drugs   Whether you are sexually active   Whether you feel safe at home  Your doctor will talk to you about things you can do to improve your health and lower your risk of health problems. They will also offer help and support. For example, if you want to quit smoking, they can give you advice and might prescribe medicines. If you want to improve your diet or get more physical activity, they can help you with this, too.   Lab tests, if needed - The tests you get will depend on your age and situation. For example, your doctor might want to check your:   Cholesterol   Blood sugar   Iron level   Vaccines - The recommended vaccines will depend on your age, health, and what vaccines you already had. Vaccines are very important because they can prevent certain serious or deadly infections.   Discussion of screening - \"Screening\" means checking for diseases or other health problems before they cause symptoms. Your doctor can recommend screening based on your age, risk, and preferences. This might include tests to check for:   Cancer, such as breast, prostate, cervical, ovarian, colorectal, prostate, lung, or skin cancer   Sexually transmitted infections, such as chlamydia and gonorrhea   Mental health conditions like depression and anxiety  Your doctor will talk to you about the different types of screening tests. They can help you decide which screenings to have. They can also explain what the results might mean.   Answering questions - The physical is a good time to ask the doctor or nurse questions about your health. If needed, they can refer you to other doctors or specialists, too.  Adults older than 65 years often need other care, too. As you get older, your doctor will talk to you about:   How to prevent falling at " home   Hearing or vision tests   Memory testing   How to take your medicines safely   Making sure that you have the help and support you need at home  All topics are updated as new evidence becomes available and our peer review process is complete.  This topic retrieved from [employer] on: May 02, 2024.  Topic 645495 Version 1.0  Release: 32.4.3 - C32.122  © 2024 UpToDate, Inc. and/or its affiliates. All rights reserved.  Consumer Information Use and Disclaimer   Disclaimer: This generalized information is a limited summary of diagnosis, treatment, and/or medication information. It is not meant to be comprehensive and should be used as a tool to help the user understand and/or assess potential diagnostic and treatment options. It does NOT include all information about conditions, treatments, medications, side effects, or risks that may apply to a specific patient. It is not intended to be medical advice or a substitute for the medical advice, diagnosis, or treatment of a health care provider based on the health care provider's examination and assessment of a patient's specific and unique circumstances. Patients must speak with a health care provider for complete information about their health, medical questions, and treatment options, including any risks or benefits regarding use of medications. This information does not endorse any treatments or medications as safe, effective, or approved for treating a specific patient. Up[employer]Date, Inc. and its affiliates disclaim any warranty or liability relating to this information or the use thereof.The use of this information is governed by the Terms of Use, available at https://www.woltersGorbuwer.com/en/know/clinical-effectiveness-terms. 2024© UpToDate, Inc. and its affiliates and/or licensors. All rights reserved.  Copyright   © 2024 Up[employer]Date, Inc. and/or its affiliates. All rights reserved.

## 2025-04-25 DIAGNOSIS — F32.5 MAJOR DEPRESSIVE DISORDER WITH SINGLE EPISODE, IN FULL REMISSION (HCC): ICD-10-CM

## 2025-04-25 RX ORDER — CITALOPRAM HYDROBROMIDE 10 MG/1
10 TABLET ORAL DAILY
Qty: 30 TABLET | Refills: 5 | Status: SHIPPED | OUTPATIENT
Start: 2025-04-25
